# Patient Record
Sex: FEMALE | URBAN - METROPOLITAN AREA
[De-identification: names, ages, dates, MRNs, and addresses within clinical notes are randomized per-mention and may not be internally consistent; named-entity substitution may affect disease eponyms.]

---

## 2021-01-22 ENCOUNTER — IMMUNIZATION (OUTPATIENT)
Dept: LAB | Facility: HOSPITAL | Age: 41
End: 2021-01-22
Attending: EMERGENCY MEDICINE
Payer: MEDICAID

## 2021-01-22 DIAGNOSIS — Z23 NEED FOR VACCINATION: Primary | ICD-10-CM

## 2021-01-22 PROCEDURE — 0011A SARSCOV2 VAC 100MCG/0.5ML IM: CPT

## 2021-02-19 ENCOUNTER — IMMUNIZATION (OUTPATIENT)
Dept: LAB | Facility: HOSPITAL | Age: 41
End: 2021-02-19
Attending: EMERGENCY MEDICINE
Payer: MEDICAID

## 2021-02-19 DIAGNOSIS — Z23 NEED FOR VACCINATION: Primary | ICD-10-CM

## 2021-02-19 PROCEDURE — 0012A SARSCOV2 VAC 100MCG/0.5ML IM: CPT

## 2022-05-11 ENCOUNTER — EMPLOYEE HEALTH (OUTPATIENT)
Dept: OTHER | Facility: HOSPITAL | Age: 42
End: 2022-05-11
Attending: PREVENTIVE MEDICINE

## 2022-05-11 DIAGNOSIS — Z11.1 SCREENING-PULMONARY TB: ICD-10-CM

## 2022-05-11 DIAGNOSIS — Z01.84 IMMUNITY STATUS TESTING: Primary | ICD-10-CM

## 2022-05-11 LAB
MEV IGG SER-ACNC: >300 AU/ML (ref 16.5–?)
MUV IGG SER IA-ACNC: 88.9 AU/ML (ref 11–?)
RUBV IGG SER QL: POSITIVE
RUBV IGG SER-ACNC: 69.2 IU/ML (ref 10–?)
VZV IGG SER IA-ACNC: 536.5 (ref 165–?)

## 2022-05-11 PROCEDURE — 86787 VARICELLA-ZOSTER ANTIBODY: CPT

## 2022-05-11 PROCEDURE — 86735 MUMPS ANTIBODY: CPT

## 2022-05-11 PROCEDURE — 86765 RUBEOLA ANTIBODY: CPT

## 2022-05-11 PROCEDURE — 86480 TB TEST CELL IMMUN MEASURE: CPT

## 2022-05-11 PROCEDURE — 86762 RUBELLA ANTIBODY: CPT

## 2022-05-12 LAB
M TB IFN-G CD4+ T-CELLS BLD-ACNC: 0.09 IU/ML
M TB TUBERC IFN-G BLD QL: NEGATIVE
M TB TUBERC IGNF/MITOGEN IGNF CONTROL: >10 IU/ML
QFT TB1 AG MINUS NIL: 0 IU/ML
QFT TB2 AG MINUS NIL: -0.02 IU/ML

## 2022-10-12 ENCOUNTER — TELEPHONE (OUTPATIENT)
Dept: INTERNAL MEDICINE CLINIC | Facility: HOSPITAL | Age: 42
End: 2022-10-12

## 2022-10-12 ENCOUNTER — LAB ENCOUNTER (OUTPATIENT)
Dept: LAB | Age: 42
End: 2022-10-12
Attending: PREVENTIVE MEDICINE
Payer: COMMERCIAL

## 2022-10-12 DIAGNOSIS — Z20.822 SUSPECTED COVID-19 VIRUS INFECTION: ICD-10-CM

## 2022-10-12 DIAGNOSIS — Z20.822 SUSPECTED COVID-19 VIRUS INFECTION: Primary | ICD-10-CM

## 2022-10-12 LAB — SARS-COV-2 RNA RESP QL NAA+PROBE: NOT DETECTED

## 2022-11-07 ENCOUNTER — IMMUNIZATION (OUTPATIENT)
Dept: LAB | Age: 42
End: 2022-11-07
Attending: EMERGENCY MEDICINE
Payer: COMMERCIAL

## 2022-11-07 DIAGNOSIS — Z23 NEED FOR VACCINATION: Primary | ICD-10-CM

## 2022-11-07 PROCEDURE — 90686 IIV4 VACC NO PRSV 0.5 ML IM: CPT

## 2022-11-07 PROCEDURE — 90471 IMMUNIZATION ADMIN: CPT

## 2022-11-25 ENCOUNTER — OFFICE VISIT (OUTPATIENT)
Dept: FAMILY MEDICINE CLINIC | Facility: CLINIC | Age: 42
End: 2022-11-25
Payer: COMMERCIAL

## 2022-11-25 VITALS
BODY MASS INDEX: 32.29 KG/M2 | DIASTOLIC BLOOD PRESSURE: 72 MMHG | SYSTOLIC BLOOD PRESSURE: 117 MMHG | HEART RATE: 77 BPM | TEMPERATURE: 98 F | OXYGEN SATURATION: 98 % | HEIGHT: 59 IN | WEIGHT: 160.19 LBS

## 2022-11-25 DIAGNOSIS — Z00.00 ANNUAL PHYSICAL EXAM: Primary | ICD-10-CM

## 2022-11-25 PROCEDURE — 3008F BODY MASS INDEX DOCD: CPT | Performed by: FAMILY MEDICINE

## 2022-11-25 PROCEDURE — 3074F SYST BP LT 130 MM HG: CPT | Performed by: FAMILY MEDICINE

## 2022-11-25 PROCEDURE — 3078F DIAST BP <80 MM HG: CPT | Performed by: FAMILY MEDICINE

## 2022-11-25 PROCEDURE — 99386 PREV VISIT NEW AGE 40-64: CPT | Performed by: FAMILY MEDICINE

## 2022-11-25 RX ORDER — LEVOTHYROXINE SODIUM 88 UG/1
TABLET ORAL
COMMUNITY
Start: 2021-08-24 | End: 2022-11-28

## 2022-11-25 RX ORDER — ERGOCALCIFEROL 1.25 MG/1
50000 CAPSULE ORAL WEEKLY
COMMUNITY
Start: 2022-08-04

## 2022-11-25 RX ORDER — MONTELUKAST SODIUM 10 MG/1
10 TABLET ORAL DAILY
COMMUNITY
Start: 2022-06-06

## 2022-11-25 RX ORDER — CETIRIZINE HYDROCHLORIDE 10 MG/1
CAPSULE, LIQUID FILLED ORAL
COMMUNITY

## 2022-11-25 RX ORDER — LEVOTHYROXINE SODIUM 88 UG/1
TABLET ORAL
COMMUNITY
Start: 2022-11-07 | End: 2022-11-28

## 2022-11-25 RX ORDER — CETIRIZINE HYDROCHLORIDE 10 MG/1
10 TABLET ORAL DAILY
COMMUNITY
Start: 2022-06-06

## 2022-11-25 RX ORDER — PREDNISONE 20 MG/1
40 TABLET ORAL DAILY
COMMUNITY
Start: 2022-10-11

## 2022-11-25 RX ORDER — IBUPROFEN 800 MG/1
800 TABLET ORAL EVERY 8 HOURS PRN
COMMUNITY
Start: 2022-10-11

## 2022-11-27 ENCOUNTER — LAB ENCOUNTER (OUTPATIENT)
Dept: LAB | Age: 42
End: 2022-11-27
Attending: FAMILY MEDICINE
Payer: COMMERCIAL

## 2022-11-27 DIAGNOSIS — Z00.00 ANNUAL PHYSICAL EXAM: ICD-10-CM

## 2022-11-27 LAB
ALBUMIN SERPL-MCNC: 3.5 G/DL (ref 3.4–5)
ALBUMIN/GLOB SERPL: 0.9 {RATIO} (ref 1–2)
ALP LIVER SERPL-CCNC: 91 U/L
ALT SERPL-CCNC: 45 U/L
ANION GAP SERPL CALC-SCNC: 5 MMOL/L (ref 0–18)
AST SERPL-CCNC: 23 U/L (ref 15–37)
BASOPHILS # BLD AUTO: 0.04 X10(3) UL (ref 0–0.2)
BASOPHILS NFR BLD AUTO: 0.5 %
BILIRUB SERPL-MCNC: 0.3 MG/DL (ref 0.1–2)
BUN BLD-MCNC: 23 MG/DL (ref 7–18)
BUN/CREAT SERPL: 31.1 (ref 10–20)
CALCIUM BLD-MCNC: 8.6 MG/DL (ref 8.5–10.1)
CHLORIDE SERPL-SCNC: 109 MMOL/L (ref 98–112)
CHOLEST SERPL-MCNC: 171 MG/DL (ref ?–200)
CO2 SERPL-SCNC: 26 MMOL/L (ref 21–32)
CREAT BLD-MCNC: 0.74 MG/DL
DEPRECATED RDW RBC AUTO: 44 FL (ref 35.1–46.3)
EOSINOPHIL # BLD AUTO: 0.43 X10(3) UL (ref 0–0.7)
EOSINOPHIL NFR BLD AUTO: 5.3 %
ERYTHROCYTE [DISTWIDTH] IN BLOOD BY AUTOMATED COUNT: 12.8 % (ref 11–15)
EST. AVERAGE GLUCOSE BLD GHB EST-MCNC: 114 MG/DL (ref 68–126)
FASTING PATIENT LIPID ANSWER: YES
FASTING STATUS PATIENT QL REPORTED: YES
GFR SERPLBLD BASED ON 1.73 SQ M-ARVRAT: 102 ML/MIN/1.73M2 (ref 60–?)
GLOBULIN PLAS-MCNC: 3.9 G/DL (ref 2.8–4.4)
GLUCOSE BLD-MCNC: 95 MG/DL (ref 70–99)
HBA1C MFR BLD: 5.6 % (ref ?–5.7)
HCT VFR BLD AUTO: 41.9 %
HDLC SERPL-MCNC: 55 MG/DL (ref 40–59)
HGB BLD-MCNC: 13.5 G/DL
IMM GRANULOCYTES # BLD AUTO: 0.04 X10(3) UL (ref 0–1)
IMM GRANULOCYTES NFR BLD: 0.5 %
LDLC SERPL CALC-MCNC: 98 MG/DL (ref ?–100)
LYMPHOCYTES # BLD AUTO: 1.73 X10(3) UL (ref 1–4)
LYMPHOCYTES NFR BLD AUTO: 21.4 %
MCH RBC QN AUTO: 30.1 PG (ref 26–34)
MCHC RBC AUTO-ENTMCNC: 32.2 G/DL (ref 31–37)
MCV RBC AUTO: 93.3 FL
MONOCYTES # BLD AUTO: 0.58 X10(3) UL (ref 0.1–1)
MONOCYTES NFR BLD AUTO: 7.2 %
NEUTROPHILS # BLD AUTO: 5.25 X10 (3) UL (ref 1.5–7.7)
NEUTROPHILS # BLD AUTO: 5.25 X10(3) UL (ref 1.5–7.7)
NEUTROPHILS NFR BLD AUTO: 65.1 %
NONHDLC SERPL-MCNC: 116 MG/DL (ref ?–130)
OSMOLALITY SERPL CALC.SUM OF ELEC: 293 MOSM/KG (ref 275–295)
PLATELET # BLD AUTO: 235 10(3)UL (ref 150–450)
POTASSIUM SERPL-SCNC: 3.9 MMOL/L (ref 3.5–5.1)
PROT SERPL-MCNC: 7.4 G/DL (ref 6.4–8.2)
RBC # BLD AUTO: 4.49 X10(6)UL
SODIUM SERPL-SCNC: 140 MMOL/L (ref 136–145)
TRIGL SERPL-MCNC: 98 MG/DL (ref 30–149)
TSI SER-ACNC: 8.32 MIU/ML (ref 0.36–3.74)
VLDLC SERPL CALC-MCNC: 16 MG/DL (ref 0–30)
WBC # BLD AUTO: 8.1 X10(3) UL (ref 4–11)

## 2022-11-27 PROCEDURE — 80053 COMPREHEN METABOLIC PANEL: CPT

## 2022-11-27 PROCEDURE — 85025 COMPLETE CBC W/AUTO DIFF WBC: CPT

## 2022-11-27 PROCEDURE — 84443 ASSAY THYROID STIM HORMONE: CPT

## 2022-11-27 PROCEDURE — 80061 LIPID PANEL: CPT

## 2022-11-27 PROCEDURE — 83036 HEMOGLOBIN GLYCOSYLATED A1C: CPT

## 2022-11-28 ENCOUNTER — TELEPHONE (OUTPATIENT)
Dept: FAMILY MEDICINE CLINIC | Facility: CLINIC | Age: 42
End: 2022-11-28

## 2022-11-28 DIAGNOSIS — E03.9 HYPOTHYROIDISM, UNSPECIFIED TYPE: Primary | ICD-10-CM

## 2022-11-28 RX ORDER — LEVOTHYROXINE SODIUM 0.1 MG/1
100 TABLET ORAL DAILY
Qty: 90 TABLET | Refills: 3 | Status: SHIPPED | OUTPATIENT
Start: 2022-11-28 | End: 2023-11-23

## 2022-11-29 ENCOUNTER — HOSPITAL ENCOUNTER (OUTPATIENT)
Dept: MAMMOGRAPHY | Age: 42
Discharge: HOME | End: 2022-11-29
Attending: FAMILY MEDICINE
Payer: COMMERCIAL

## 2022-11-29 DIAGNOSIS — Z00.00 ANNUAL PHYSICAL EXAM: ICD-10-CM

## 2022-11-29 PROCEDURE — 77063 BREAST TOMOSYNTHESIS BI: CPT | Performed by: FAMILY MEDICINE

## 2022-11-29 PROCEDURE — 77067 SCR MAMMO BI INCL CAD: CPT | Performed by: FAMILY MEDICINE

## 2022-12-01 ENCOUNTER — MED REC SCAN ONLY (OUTPATIENT)
Dept: FAMILY MEDICINE CLINIC | Facility: CLINIC | Age: 42
End: 2022-12-01

## 2022-12-01 ENCOUNTER — TELEPHONE (OUTPATIENT)
Dept: FAMILY MEDICINE CLINIC | Facility: CLINIC | Age: 42
End: 2022-12-01

## 2023-01-24 ENCOUNTER — APPOINTMENT (OUTPATIENT)
Dept: URBAN - METROPOLITAN AREA CLINIC 244 | Age: 43
Setting detail: DERMATOLOGY
End: 2023-01-24

## 2023-01-24 DIAGNOSIS — L81.1 CHLOASMA: ICD-10-CM

## 2023-01-24 PROCEDURE — OTHER PRESCRIPTION: OTHER

## 2023-01-24 PROCEDURE — 99203 OFFICE O/P NEW LOW 30 MIN: CPT

## 2023-01-24 PROCEDURE — OTHER COUNSELING: OTHER

## 2023-01-24 RX ORDER — FLUOCINOLONE ACETONIDE, HYDROQUINONE, AND TRETINOIN .1; 40; .5 MG/G; MG/G; MG/G
CREAM TOPICAL QHS
Qty: 30 | Refills: 3 | Status: ACTIVE

## 2023-01-24 ASSESSMENT — LOCATION SIMPLE DESCRIPTION DERM
LOCATION SIMPLE: LEFT CHEEK
LOCATION SIMPLE: SUPERIOR FOREHEAD
LOCATION SIMPLE: RIGHT CHEEK

## 2023-01-24 ASSESSMENT — LOCATION DETAILED DESCRIPTION DERM
LOCATION DETAILED: SUPERIOR MID FOREHEAD
LOCATION DETAILED: LEFT INFERIOR CENTRAL MALAR CHEEK
LOCATION DETAILED: RIGHT INFERIOR CENTRAL MALAR CHEEK

## 2023-01-24 ASSESSMENT — LOCATION ZONE DERM: LOCATION ZONE: FACE

## 2023-01-24 NOTE — HPI: RASH
How Severe Is Your Rash?: mild
Is This A New Presentation, Or A Follow-Up?: Rash
Additional History: Used topical 3 years ago but rash recurred, got worse after pregnancy

## 2023-02-15 ENCOUNTER — TELEPHONE (OUTPATIENT)
Dept: INTERNAL MEDICINE CLINIC | Facility: HOSPITAL | Age: 43
End: 2023-02-15

## 2023-02-15 ENCOUNTER — LAB ENCOUNTER (OUTPATIENT)
Dept: LAB | Facility: HOSPITAL | Age: 43
End: 2023-02-15
Attending: PREVENTIVE MEDICINE

## 2023-02-15 DIAGNOSIS — Z20.822 SUSPECTED 2019 NOVEL CORONAVIRUS INFECTION: ICD-10-CM

## 2023-02-15 DIAGNOSIS — Z20.822 SUSPECTED 2019 NOVEL CORONAVIRUS INFECTION: Primary | ICD-10-CM

## 2023-02-15 LAB — SARS-COV-2 RNA RESP QL NAA+PROBE: NOT DETECTED

## 2023-02-16 NOTE — TELEPHONE ENCOUNTER
Results and RTW guidelines:    COVID RESULT:    [x] Viewed by employee in [address] RTW plan and instructions as indicated on triage call. Manager notified. Estimated RTW date: 02/16/2023  [] Discussed with employee   [x] Unable to reach by phone.   Sent via [employer] message      Test type:    [x] Rapid         [] Alinity         [] Outside test:       [x] NEGATIVE     Ordered Alinity retest?  [x]Yes   [] No (skip to RTW)   Ordered Rapid retest?   []Yes   [x] No (skip to RTW)           Dated to be taken:  02/17/2023    If Yes, PLACE ORDER NOW and instruct the following:  -Originally Symptomatic or Now Symptoms:   -RTW when sx improve- fever free for 24 hours w/o medications, Diarrhea/Vomiting for 24 hours w/o medications    -Originally  Asymptomatic  -Asymptomatic AND Vaccinated or Unvaccinated or Prior infection in past 90 days:   -May work and continue to monitor symptoms for the next 14 days.                                         -Rapid test day 2, rapid test day 5 (day 0 - exposure)

## 2023-03-27 ENCOUNTER — OFFICE VISIT (OUTPATIENT)
Dept: FAMILY MEDICINE CLINIC | Facility: CLINIC | Age: 43
End: 2023-03-27

## 2023-03-27 VITALS
DIASTOLIC BLOOD PRESSURE: 79 MMHG | BODY MASS INDEX: 34 KG/M2 | SYSTOLIC BLOOD PRESSURE: 115 MMHG | TEMPERATURE: 97 F | HEART RATE: 69 BPM | WEIGHT: 166 LBS

## 2023-03-27 DIAGNOSIS — Z91.018 FOOD ALLERGY: ICD-10-CM

## 2023-03-27 DIAGNOSIS — E04.1 THYROID NODULE: ICD-10-CM

## 2023-03-27 DIAGNOSIS — Z12.4 CERVICAL CANCER SCREENING: ICD-10-CM

## 2023-03-27 DIAGNOSIS — J30.2 SEASONAL ALLERGIES: ICD-10-CM

## 2023-03-27 DIAGNOSIS — E03.9 HYPOTHYROIDISM, UNSPECIFIED TYPE: Primary | ICD-10-CM

## 2023-03-27 PROCEDURE — 3074F SYST BP LT 130 MM HG: CPT | Performed by: FAMILY MEDICINE

## 2023-03-27 PROCEDURE — 99214 OFFICE O/P EST MOD 30 MIN: CPT | Performed by: FAMILY MEDICINE

## 2023-03-27 PROCEDURE — 3078F DIAST BP <80 MM HG: CPT | Performed by: FAMILY MEDICINE

## 2023-03-27 RX ORDER — EPINEPHRINE 0.3 MG/.3ML
INJECTION SUBCUTANEOUS
COMMUNITY
Start: 2023-03-06

## 2023-03-28 ENCOUNTER — LAB ENCOUNTER (OUTPATIENT)
Dept: LAB | Age: 43
End: 2023-03-28
Attending: FAMILY MEDICINE
Payer: COMMERCIAL

## 2023-03-28 DIAGNOSIS — E03.9 HYPOTHYROIDISM, UNSPECIFIED TYPE: ICD-10-CM

## 2023-03-28 LAB — TSI SER-ACNC: 2.04 MIU/ML (ref 0.36–3.74)

## 2023-03-28 PROCEDURE — 84443 ASSAY THYROID STIM HORMONE: CPT

## 2023-03-28 PROCEDURE — 36415 COLL VENOUS BLD VENIPUNCTURE: CPT

## 2023-03-30 ENCOUNTER — OFFICE VISIT (OUTPATIENT)
Dept: OBGYN CLINIC | Facility: CLINIC | Age: 43
End: 2023-03-30

## 2023-03-30 VITALS
WEIGHT: 164.81 LBS | HEIGHT: 59 IN | BODY MASS INDEX: 33.23 KG/M2 | SYSTOLIC BLOOD PRESSURE: 121 MMHG | DIASTOLIC BLOOD PRESSURE: 78 MMHG

## 2023-03-30 DIAGNOSIS — R53.83 OTHER FATIGUE: ICD-10-CM

## 2023-03-30 DIAGNOSIS — Z11.3 SCREENING EXAMINATION FOR SEXUALLY TRANSMITTED DISEASE: ICD-10-CM

## 2023-03-30 DIAGNOSIS — N89.8 VAGINAL ITCHING: ICD-10-CM

## 2023-03-30 DIAGNOSIS — R63.5 WEIGHT GAIN: ICD-10-CM

## 2023-03-30 DIAGNOSIS — N94.6 DYSMENORRHEA: ICD-10-CM

## 2023-03-30 DIAGNOSIS — G43.109 MIGRAINE WITH AURA AND WITHOUT STATUS MIGRAINOSUS, NOT INTRACTABLE: ICD-10-CM

## 2023-03-30 DIAGNOSIS — E04.9 ENLARGED THYROID: ICD-10-CM

## 2023-03-30 DIAGNOSIS — D21.9 FIBROIDS: ICD-10-CM

## 2023-03-30 DIAGNOSIS — Z63.79 OTHER STRESSFUL LIFE EVENTS AFFECTING FAMILY AND HOUSEHOLD: ICD-10-CM

## 2023-03-30 DIAGNOSIS — E03.9 HYPOTHYROIDISM, UNSPECIFIED TYPE: ICD-10-CM

## 2023-03-30 DIAGNOSIS — N39.3 STRESS INCONTINENCE IN FEMALE: ICD-10-CM

## 2023-03-30 DIAGNOSIS — Z12.4 SCREENING FOR MALIGNANT NEOPLASM OF CERVIX: ICD-10-CM

## 2023-03-30 DIAGNOSIS — Z01.419 WELL WOMAN EXAM: Primary | ICD-10-CM

## 2023-03-30 PROCEDURE — 99386 PREV VISIT NEW AGE 40-64: CPT | Performed by: NURSE PRACTITIONER

## 2023-03-30 PROCEDURE — 3078F DIAST BP <80 MM HG: CPT | Performed by: NURSE PRACTITIONER

## 2023-03-30 PROCEDURE — 3074F SYST BP LT 130 MM HG: CPT | Performed by: NURSE PRACTITIONER

## 2023-03-30 PROCEDURE — 3008F BODY MASS INDEX DOCD: CPT | Performed by: NURSE PRACTITIONER

## 2023-03-31 LAB
C TRACH DNA SPEC QL NAA+PROBE: NEGATIVE
N GONORRHOEA DNA SPEC QL NAA+PROBE: NEGATIVE

## 2023-04-01 LAB
GENITAL VAGINOSIS SCREEN: NEGATIVE
TRICHOMONAS SCREEN: NEGATIVE

## 2023-04-03 LAB — HPV I/H RISK 1 DNA SPEC QL NAA+PROBE: NEGATIVE

## 2023-05-16 ENCOUNTER — APPOINTMENT (OUTPATIENT)
Dept: URBAN - METROPOLITAN AREA CLINIC 244 | Age: 43
Setting detail: DERMATOLOGY
End: 2023-05-16

## 2023-05-16 DIAGNOSIS — L81.1 CHLOASMA: ICD-10-CM

## 2023-05-16 PROCEDURE — OTHER COUNSELING: OTHER

## 2023-05-16 PROCEDURE — OTHER PRESCRIPTION MEDICATION MANAGEMENT: OTHER

## 2023-05-16 ASSESSMENT — LOCATION SIMPLE DESCRIPTION DERM
LOCATION SIMPLE: RIGHT CHEEK
LOCATION SIMPLE: LEFT CHEEK
LOCATION SIMPLE: SUPERIOR FOREHEAD

## 2023-05-16 ASSESSMENT — LOCATION DETAILED DESCRIPTION DERM
LOCATION DETAILED: RIGHT INFERIOR CENTRAL MALAR CHEEK
LOCATION DETAILED: LEFT INFERIOR CENTRAL MALAR CHEEK
LOCATION DETAILED: SUPERIOR MID FOREHEAD

## 2023-05-16 ASSESSMENT — LOCATION ZONE DERM: LOCATION ZONE: FACE

## 2023-05-16 NOTE — PROCEDURE: PRESCRIPTION MEDICATION MANAGEMENT
Detail Level: Detailed
Continue Regimen: Tri-Kenisha 0.01 %-4 %-0.05 % topical cream QHS\\nQuantity: 30.0 g\\nSig: Apply at bedtime to discolored skin x 8 weeks. Avoid sunlight.
Render In Strict Bullet Format?: No

## 2023-06-03 ENCOUNTER — HOSPITAL ENCOUNTER (OUTPATIENT)
Dept: ULTRASOUND IMAGING | Facility: HOSPITAL | Age: 43
Discharge: HOME | End: 2023-06-03
Attending: FAMILY MEDICINE
Payer: COMMERCIAL

## 2023-06-03 DIAGNOSIS — E04.1 THYROID NODULE: ICD-10-CM

## 2023-06-03 DIAGNOSIS — E03.9 HYPOTHYROIDISM, UNSPECIFIED TYPE: ICD-10-CM

## 2023-06-03 PROCEDURE — 76536 US EXAM OF HEAD AND NECK: CPT | Performed by: FAMILY MEDICINE

## 2023-06-08 DIAGNOSIS — E06.9 THYROIDITIS: Primary | ICD-10-CM

## 2023-08-21 ENCOUNTER — LAB ENCOUNTER (OUTPATIENT)
Dept: LAB | Age: 43
End: 2023-08-21
Attending: NURSE PRACTITIONER
Payer: COMMERCIAL

## 2023-08-21 DIAGNOSIS — R53.83 OTHER FATIGUE: ICD-10-CM

## 2023-08-21 DIAGNOSIS — E03.9 HYPOTHYROIDISM, UNSPECIFIED TYPE: ICD-10-CM

## 2023-08-21 DIAGNOSIS — E06.9 THYROIDITIS: ICD-10-CM

## 2023-08-21 LAB
T3 SERPL-MCNC: 88 NG/DL (ref 60–181)
T3FREE SERPL-MCNC: 1.55 PG/ML (ref 2.4–4.2)
T4 FREE SERPL-MCNC: 0.5 NG/DL (ref 0.8–1.7)
T4 SERPL-MCNC: 5.1 UG/DL
THYROGLOB SERPL-MCNC: >500 U/ML (ref ?–60)
THYROPEROXIDASE AB SERPL-ACNC: 1575 U/ML (ref ?–60)
TSI SER-ACNC: 21.7 MIU/ML (ref 0.36–3.74)
VIT D+METAB SERPL-MCNC: 20.5 NG/ML (ref 30–100)

## 2023-08-21 PROCEDURE — 36415 COLL VENOUS BLD VENIPUNCTURE: CPT

## 2023-08-21 PROCEDURE — 84443 ASSAY THYROID STIM HORMONE: CPT

## 2023-08-21 PROCEDURE — 82306 VITAMIN D 25 HYDROXY: CPT

## 2023-08-21 PROCEDURE — 84481 FREE ASSAY (FT-3): CPT

## 2023-08-21 PROCEDURE — 86800 THYROGLOBULIN ANTIBODY: CPT

## 2023-08-21 PROCEDURE — 86376 MICROSOMAL ANTIBODY EACH: CPT

## 2023-08-21 PROCEDURE — 84439 ASSAY OF FREE THYROXINE: CPT

## 2023-08-22 DIAGNOSIS — E06.9 THYROIDITIS: Primary | ICD-10-CM

## 2023-08-22 RX ORDER — LEVOTHYROXINE SODIUM 112 UG/1
112 TABLET ORAL
Qty: 90 TABLET | Refills: 1 | Status: SHIPPED | OUTPATIENT
Start: 2023-08-22

## 2023-11-29 ENCOUNTER — OFFICE VISIT (OUTPATIENT)
Dept: FAMILY MEDICINE CLINIC | Facility: CLINIC | Age: 43
End: 2023-11-29
Payer: COMMERCIAL

## 2023-11-29 VITALS
BODY MASS INDEX: 35.68 KG/M2 | WEIGHT: 177 LBS | SYSTOLIC BLOOD PRESSURE: 127 MMHG | HEART RATE: 79 BPM | DIASTOLIC BLOOD PRESSURE: 84 MMHG | HEIGHT: 59 IN

## 2023-11-29 DIAGNOSIS — Z91.013 SHRIMP ALLERGY: ICD-10-CM

## 2023-11-29 DIAGNOSIS — D21.9 FIBROIDS: ICD-10-CM

## 2023-11-29 DIAGNOSIS — E06.3 HYPOTHYROIDISM DUE TO HASHIMOTO'S THYROIDITIS: ICD-10-CM

## 2023-11-29 DIAGNOSIS — R53.83 OTHER FATIGUE: ICD-10-CM

## 2023-11-29 DIAGNOSIS — Z12.31 SCREENING MAMMOGRAM FOR BREAST CANCER: ICD-10-CM

## 2023-11-29 DIAGNOSIS — Z00.00 WELL ADULT EXAM: Primary | ICD-10-CM

## 2023-11-29 DIAGNOSIS — E66.9 OBESITY (BMI 35.0-39.9 WITHOUT COMORBIDITY): ICD-10-CM

## 2023-11-29 DIAGNOSIS — J30.9 ALLERGIC RHINITIS, UNSPECIFIED SEASONALITY, UNSPECIFIED TRIGGER: ICD-10-CM

## 2023-11-29 DIAGNOSIS — N94.6 DYSMENORRHEA: ICD-10-CM

## 2023-11-29 DIAGNOSIS — E03.8 HYPOTHYROIDISM DUE TO HASHIMOTO'S THYROIDITIS: ICD-10-CM

## 2023-11-29 PROBLEM — E03.9 HYPOTHYROIDISM: Status: RESOLVED | Noted: 2023-03-27 | Resolved: 2023-11-29

## 2023-11-29 PROCEDURE — 99396 PREV VISIT EST AGE 40-64: CPT | Performed by: NURSE PRACTITIONER

## 2023-11-29 PROCEDURE — 99213 OFFICE O/P EST LOW 20 MIN: CPT | Performed by: NURSE PRACTITIONER

## 2023-11-29 PROCEDURE — 3074F SYST BP LT 130 MM HG: CPT | Performed by: NURSE PRACTITIONER

## 2023-11-29 PROCEDURE — 3008F BODY MASS INDEX DOCD: CPT | Performed by: NURSE PRACTITIONER

## 2023-11-29 PROCEDURE — 3079F DIAST BP 80-89 MM HG: CPT | Performed by: NURSE PRACTITIONER

## 2023-11-29 RX ORDER — EPINEPHRINE 0.3 MG/.3ML
INJECTION SUBCUTANEOUS
Qty: 2 EACH | Refills: 5 | Status: SHIPPED | OUTPATIENT
Start: 2023-11-29

## 2023-11-29 NOTE — ASSESSMENT & PLAN NOTE
Screening labs  Mammogram ordered  Please aim to eat a diet high in fresh fruits and vegetables, lean protein sources, complex carbohydrates and limited processed and fast foods. Try to get at least 150 minutes of exercise per week-a combination of weight resistance and cardio is preferred.     Refer dietician   Had flu shot  Up to date pap

## 2023-11-29 NOTE — ASSESSMENT & PLAN NOTE
Recheck tsh levels  Continue levothyroxine 112 mcg daily  Please aim to eat a diet high in fresh fruits and vegetables, lean protein sources, complex carbohydrates and limited processed and fast foods. Try to get at least 150 minutes of exercise per week-a combination of weight resistance and cardio is preferred.

## 2023-11-29 NOTE — ASSESSMENT & PLAN NOTE
It is essential that you decrease the amount of carbohydrates that you ingest (bread products, rice, pasta, potatoes, starchy vegetables and sugary beverages). Also try to increase the amount of vegetables and protein that you eat daily. Decrease the amount of processed and fast foods. Try to exercise at least 30 minutes per day, 4-5 times per week, combination of cardio and weight training.    Refer dietician

## 2023-11-29 NOTE — ASSESSMENT & PLAN NOTE
Likely related to fibroids and han-menopause  Discussed use of nsaids for pain  Follow up ob gyne if symptoms not improving.

## 2023-11-30 ENCOUNTER — LAB ENCOUNTER (OUTPATIENT)
Dept: LAB | Facility: HOSPITAL | Age: 43
End: 2023-11-30
Attending: NURSE PRACTITIONER
Payer: COMMERCIAL

## 2023-11-30 DIAGNOSIS — E03.8 HYPOTHYROIDISM DUE TO HASHIMOTO'S THYROIDITIS: ICD-10-CM

## 2023-11-30 DIAGNOSIS — E06.3 HYPOTHYROIDISM DUE TO HASHIMOTO'S THYROIDITIS: ICD-10-CM

## 2023-11-30 DIAGNOSIS — Z00.00 WELL ADULT EXAM: ICD-10-CM

## 2023-11-30 DIAGNOSIS — E06.3 HYPOTHYROIDISM DUE TO HASHIMOTO'S THYROIDITIS: Primary | ICD-10-CM

## 2023-11-30 DIAGNOSIS — E03.8 HYPOTHYROIDISM DUE TO HASHIMOTO'S THYROIDITIS: Primary | ICD-10-CM

## 2023-11-30 LAB
CHOLEST SERPL-MCNC: 129 MG/DL (ref ?–200)
FASTING PATIENT GLUCOSE ANSWER: YES
FASTING PATIENT LIPID ANSWER: YES
GLUCOSE BLD-MCNC: 103 MG/DL (ref 70–99)
HDLC SERPL-MCNC: 49 MG/DL (ref 40–59)
LDLC SERPL CALC-MCNC: 57 MG/DL (ref ?–100)
NONHDLC SERPL-MCNC: 80 MG/DL (ref ?–130)
T4 FREE SERPL-MCNC: 0.9 NG/DL (ref 0.8–1.7)
TRIGL SERPL-MCNC: 133 MG/DL (ref 30–149)
TSI SER-ACNC: 6.22 MIU/ML (ref 0.55–4.78)
VLDLC SERPL CALC-MCNC: 19 MG/DL (ref 0–30)

## 2023-11-30 PROCEDURE — 82947 ASSAY GLUCOSE BLOOD QUANT: CPT

## 2023-11-30 PROCEDURE — 80061 LIPID PANEL: CPT

## 2023-11-30 PROCEDURE — 36415 COLL VENOUS BLD VENIPUNCTURE: CPT

## 2023-11-30 PROCEDURE — 84439 ASSAY OF FREE THYROXINE: CPT

## 2023-11-30 PROCEDURE — 84443 ASSAY THYROID STIM HORMONE: CPT

## 2023-11-30 RX ORDER — LEVOTHYROXINE SODIUM 0.12 MG/1
125 TABLET ORAL
Qty: 90 TABLET | Refills: 1 | Status: SHIPPED | OUTPATIENT
Start: 2023-11-30

## 2023-12-06 ENCOUNTER — TELEPHONE (OUTPATIENT)
Dept: OBGYN CLINIC | Facility: CLINIC | Age: 43
End: 2023-12-06

## 2023-12-06 RX ORDER — CETIRIZINE HYDROCHLORIDE 10 MG/1
10 TABLET ORAL DAILY
Qty: 90 TABLET | Refills: 0 | Status: SHIPPED | OUTPATIENT
Start: 2023-12-06 | End: 2024-03-05

## 2023-12-06 RX ORDER — CETIRIZINE HYDROCHLORIDE 10 MG/1
10 TABLET ORAL DAILY
Qty: 90 TABLET | Refills: 0 | Status: SHIPPED | OUTPATIENT
Start: 2023-12-06 | End: 2023-12-06

## 2023-12-27 ENCOUNTER — TELEPHONE (OUTPATIENT)
Dept: INTERNAL MEDICINE CLINIC | Facility: HOSPITAL | Age: 43
End: 2023-12-27

## 2023-12-27 ENCOUNTER — LAB ENCOUNTER (OUTPATIENT)
Dept: LAB | Age: 43
End: 2023-12-27
Attending: PREVENTIVE MEDICINE
Payer: COMMERCIAL

## 2023-12-27 DIAGNOSIS — Z20.822 SUSPECTED COVID-19 VIRUS INFECTION: Primary | ICD-10-CM

## 2023-12-27 DIAGNOSIS — Z20.822 SUSPECTED COVID-19 VIRUS INFECTION: ICD-10-CM

## 2023-12-27 LAB — SARS-COV-2 RNA RESP QL NAA+PROBE: NOT DETECTED

## 2023-12-27 NOTE — TELEPHONE ENCOUNTER
[] [address] [facility]  []JOSE D   [x] [address] [facility]  Manager : Dollycaitlyn Valente    HAVE YOU RECEIVED THE COVID-19 Vaccine? Yes [x]    No []          If yes, date(s) received:    01/22/2021; 02/19/2021; 03/24/2022        Which vaccine:  Pfizer []     Halie Hernandez [x]    J&J []      SYMPTOMS (reported via dashboard):  [] asymptomatic  [x] symptomatic  [] GI symptoms only    Symptom onset date: 12/23/2023  Fever   > 100F             Yes []      Cough                          Yes [x]      Shortness of breath  Yes []      Congestion                 Yes [x]      Runny nose                Yes [x]        Loss of Smell              Yes []        Loss of Taste             Yes []       Sore throat                 Yes [x]       Fatigue                        Yes [x]       Body Aches                Yes [x]        Chills                           Yes [x]        Headache                   Yes [x]             GI symptoms             Yes []     No [x]                     Nausea   []          Vomiting            []                                    Diarrhea  []          Upset stomach []      Employee has positive COVID Exposure? Yes []     No [x]    Date of exposure:   []  Coworker                       [] patient                        [] Family/friend    Employee has a history of Covid?   Yes []     No [x]   If Yes, when:    When was the last shift you worked?: 12/22/2023      PLAN:     [id number] testing ordered: [x] Rapid    [] Alinity              Date test is to be taken:   12/27/2023    []  Outside testing                           Notes:    INSTRUCTIONS PROVIDED:    [x]  Employee was instructed to call Central scheduling at [phone] or use [employer] to make an appointment for their testing   [x]  May return to work if employee views negative result in [address] and remains fever, vomiting, and diarrhea free  []  May continue to work if remains asymptomatic and views negative result in [address]  []  Follow up for condition update when resulting  []  If symptoms develop, stay home and call hotline for rapid test order  []  If COVID positive results, off work minimum of 5 days from positive test or onset of symptoms (day 0)     [x]  Plan noted above  [x]  Length of time to obtain results  []  Quarantine instructions  [x]  S/S of worsening infection/condition and importance of prompt medical re-evaluation including when to seek emergency care.    [x] The employee voiced understanding

## 2023-12-28 NOTE — TELEPHONE ENCOUNTER
Results and RTW guidelines:    COVID RESULT:    [x] Viewed by employee in [facility]. RTW plan and instructions as indicated on triage call. Manager notified. Estimated RTW date:   [] Discussed with employee   [x] Unable to reach by phone. Sent via [employer] message      Test type:    [x] Rapid         [] Alinity         [] Outside test:       [x] NEGATIVE     Ordered Alinity retest?  []Yes   [x] No (skip to RTW)   Ordered Rapid retest?   []Yes   [x] No (skip to RTW)           Dated to be taken:      If Yes, PLACE ORDER NOW and instruct the following:  -Originally Symptomatic or Now Symptoms:   -RTW when sx improve- fever free for 24 hours w/o medications, Diarrhea/Vomiting for 24 hours w/o medications    -Originally  Asymptomatic  -Asymptomatic AND Vaccinated or Unvaccinated or Prior infection in past 90 days:   -May work and continue to monitor symptoms for the next 10 days.                                         -Alinity test day 2, Alinity test day 5 (day 0 - exposure)      Notes:     RTW PLAN:    []  If COVID positive results, off work minimum of 5 days from positive test or onset of symptoms (day 0)        On day 5, if asymptomatic or mildly symptomatic (with improving symptoms) may return to work day 6          On day 5, if symptomatic, call Employee Health for RTW screening        []  COVID positive result - call Employee Health on day 5 after symptom onset. The employee needs to be cleared by Employee Health to RTW. [x] RTW immediately, continue to monitor for sx  [] RTW when sx improve; must be fever free for 24 hours w/o medications, Diarrhea/Vomiting free for 24 hours w/o medications  [] Alinity ordered; continue to monitor sx and call for new/worsening sx.   Discuss RTW guidelines with manager  [] May continue to work  [] Follow up with PCP  [] Home until further instruction from hotline with Alinity results  INSTRUCTIONS PROVIDED:  [x]  Plan as noted above  []  Length of time to obtain results   [] Quarantine instructions  []  Masking protocol   []  S/S of worsening infection/condition and importance of prompt medical re-evaluation including when to seek emergency care  [] If symptoms develop, stay home and call hotline for rapid test order    Estimated RTW date:      [] The employee voiced understanding of above plan/instructions  [x] Manager Notified

## 2024-03-28 ENCOUNTER — TELEPHONE (OUTPATIENT)
Dept: FAMILY MEDICINE CLINIC | Facility: CLINIC | Age: 44
End: 2024-03-28

## 2024-04-03 ENCOUNTER — TELEPHONE (OUTPATIENT)
Dept: FAMILY MEDICINE CLINIC | Facility: CLINIC | Age: 44
End: 2024-04-03

## 2024-04-03 ENCOUNTER — OFFICE VISIT (OUTPATIENT)
Dept: FAMILY MEDICINE CLINIC | Facility: CLINIC | Age: 44
End: 2024-04-03
Payer: COMMERCIAL

## 2024-04-03 VITALS
SYSTOLIC BLOOD PRESSURE: 137 MMHG | HEIGHT: 59 IN | BODY MASS INDEX: 33.5 KG/M2 | HEART RATE: 66 BPM | DIASTOLIC BLOOD PRESSURE: 81 MMHG | WEIGHT: 166.19 LBS

## 2024-04-03 DIAGNOSIS — E03.9 HYPOTHYROIDISM, UNSPECIFIED TYPE: ICD-10-CM

## 2024-04-03 DIAGNOSIS — Z12.11 SCREEN FOR COLON CANCER: ICD-10-CM

## 2024-04-03 DIAGNOSIS — E66.9 OBESITY (BMI 30.0-34.9): Primary | ICD-10-CM

## 2024-04-03 PROCEDURE — 99214 OFFICE O/P EST MOD 30 MIN: CPT | Performed by: FAMILY MEDICINE

## 2024-04-03 RX ORDER — CETIRIZINE HYDROCHLORIDE 10 MG/1
10 TABLET ORAL DAILY
Qty: 90 TABLET | Refills: 4 | Status: SHIPPED | OUTPATIENT
Start: 2024-04-03

## 2024-04-03 RX ORDER — TIRZEPATIDE 2.5 MG/.5ML
2.5 INJECTION, SOLUTION SUBCUTANEOUS WEEKLY
Qty: 2 ML | Refills: 0 | Status: SHIPPED | OUTPATIENT
Start: 2024-04-03

## 2024-04-03 NOTE — TELEPHONE ENCOUNTER
Approved    Prior authorization approved Case ID: [id number]      Payer: [employer]    [phone]    [phone]   CaseId:[id number];Status:Approved;Review Type:Prior Auth;Coverage Start Date:03/04/2024;Coverage End Date:11/29/2024;   Approval Details    Authorized from March 4, 2024 to November 29, 2024      Electronic appeal: Not supported   View History

## 2024-04-03 NOTE — PROGRESS NOTES
Libby Fonseca is a 44 year old female.   Chief Complaint   Patient presents with    Weight Loss     HPI:   New patient to me.   Newly diagnosed with levothyroxine in past few years. Now on 125 mcg and still feels very tired.   Wants to try weight loss medications. Took phentermine but caused palpitations.   Reports active at work and walking on treadmill at home. Has more energy after exercising.    Has loss 11 lbs since November - walking and changing her diet.   Current Outpatient Medications on File Prior to Visit   Medication Sig Dispense Refill    levothyroxine 125 MCG Oral Tab Take 1 tablet (125 mcg total) by mouth before breakfast. 90 tablet 1    EPINEPHrine 0.3 MG/0.3ML Injection Solution Auto-injector INJECT CONTENT OF 1 PEN INTO THE MUSCLE SIDE OF THE THIGH FOR ALLERGIC REACTION 2 each 5    montelukast 10 MG Oral Tab Take 1 tablet (10 mg total) by mouth daily.       No current facility-administered medications on file prior to visit.      Past Medical History:   Diagnosis Date    Allergic rhinitis like 8 years ago    Hyperfunctioning follicular adenoma of thyroid gland     Obesity       Social History:  Social History     Socioeconomic History    Marital status:    Tobacco Use    Smoking status: Never    Smokeless tobacco: Never   Vaping Use    Vaping Use: Never used   Substance and Sexual Activity    Alcohol use: Yes     Comment: social    Drug use: Never        REVIEW OF SYSTEMS:   GENERAL HEALTH: feels well otherwise  SKIN: denies any unusual skin lesions or rashes  HEENT: denies eye complaints,denies sore throat, denies ear pain  RESPIRATORY: denies shortness of breath, denies cough  CARDIOVASCULAR: denies chest pain  GI: denies abdominal pain and denies heartburn  NEURO: denies headaches      EXAM:   /81   Pulse 66   Ht 4' 11\" (1.499 m)   Wt 166 lb 3.2 oz (75.4 kg)   LMP 11/20/2023 (Within Days)   BMI 33.57 kg/m²   GENERAL: well developed, well nourished,in no apparent  distress  LUNGS: clear to auscultation  CARDIO: RRR without murmur  GI: good BS's,no masses, HSM or tenderness  EXTREMITIES: no cyanosis, clubbing or edema      ASSESSMENT AND PLAN:   1. Obesity (BMI 30.0-34.9)  Discussed potential side effects of zepbound and importance of drinking plenty of water, eating high fiber diet.  - Tirzepatide-Weight Management (ZEPBOUND) 2.5 MG/0.5ML Subcutaneous Solution Auto-injector; Inject 2.5 mg into the skin once a week.  Dispense: 2 mL; Refill: 0    2. Hypothyroidism, unspecified type  Repeat labs today.   - Assay, Thyroid Stim Hormone; Future  - Free T4, (Free Thyroxine); Future  - Thyroid Antithyroglobulin AB; Future    3. Screen for colon cancer  Declines GI referral - will do cologuard for now.   - COLOGUARD COLON CANCER SCREENING (EXTERNAL)      The patient indicates understanding of these issues and agrees to the plan.      Manisha Pastor MD  4/3/2024  11:26 AM

## 2024-04-03 NOTE — TELEPHONE ENCOUNTER
Zepbound prior auth needed     Please answer question below:  Has the patient engaged in a trial of behavioral modification and dietary restriction for at least 3 months?

## 2024-04-03 NOTE — TELEPHONE ENCOUNTER
Sent signed cologuard requisition order form to [employer] fax# [phone]. Confirmation rec'd

## 2024-04-23 ENCOUNTER — LAB ENCOUNTER (OUTPATIENT)
Dept: LAB | Facility: HOSPITAL | Age: 44
End: 2024-04-23
Attending: FAMILY MEDICINE
Payer: COMMERCIAL

## 2024-04-23 DIAGNOSIS — E03.9 HYPOTHYROIDISM, UNSPECIFIED TYPE: ICD-10-CM

## 2024-04-23 LAB
T4 FREE SERPL-MCNC: 1.3 NG/DL (ref 0.8–1.7)
THYROGLOB SERPL-MCNC: >500 U/ML (ref ?–60)
TSI SER-ACNC: 0.45 MIU/ML (ref 0.55–4.78)

## 2024-04-23 PROCEDURE — 36415 COLL VENOUS BLD VENIPUNCTURE: CPT

## 2024-04-23 PROCEDURE — 84439 ASSAY OF FREE THYROXINE: CPT

## 2024-04-23 PROCEDURE — 84443 ASSAY THYROID STIM HORMONE: CPT

## 2024-04-23 PROCEDURE — 86800 THYROGLOBULIN ANTIBODY: CPT

## 2024-04-25 DIAGNOSIS — E03.8 HYPOTHYROIDISM DUE TO HASHIMOTO'S THYROIDITIS: Primary | ICD-10-CM

## 2024-04-25 DIAGNOSIS — E06.3 HYPOTHYROIDISM DUE TO HASHIMOTO'S THYROIDITIS: Primary | ICD-10-CM

## 2024-06-04 DIAGNOSIS — E06.3 HYPOTHYROIDISM DUE TO HASHIMOTO'S THYROIDITIS: ICD-10-CM

## 2024-06-04 DIAGNOSIS — E03.8 HYPOTHYROIDISM DUE TO HASHIMOTO'S THYROIDITIS: ICD-10-CM

## 2024-06-04 NOTE — TELEPHONE ENCOUNTER
Pharmacy requesting refill     levothyroxine 125 MCG Oral Tab Take 1 tablet (125 mcg total) by mouth before breakfast. 90 tablet 1

## 2024-06-09 NOTE — TELEPHONE ENCOUNTER
Please review. Protocol Failed or has No Protocol.    Requested Prescriptions   Pending Prescriptions Disp Refills    levothyroxine 125 MCG Oral Tab 90 tablet 3     Sig: Take 1 tablet (125 mcg total) by mouth before breakfast.       Thyroid Medication Protocol Failed - 6/4/2024  5:14 PM        Failed - Last TSH value is normal     Lab Results   Component Value Date    TSH 0.452 (L) 04/23/2024                 Passed - TSH in past 12 months        Passed - In person appointment or virtual visit in the past 12 mos or appointment in next 3 mos     Recent Outpatient Visits              2 months ago Obesity (BMI 30.0-34.9)    [facility], Manisha Blevins MD    Office Visit    6 months ago Well adult exam    [facility], Sheri Quan APRN    Office Visit    1 year ago Well woman exam    [facility] - OB/GYN Jane Cunningham APRN    Office Visit    1 year ago Hypothyroidism, unspecified type    [facility], Love Vo MD    Office Visit    1 year ago Annual physical exam    [facility] [place] Tosha Weiss MD    Office Visit          Future Appointments         Provider Department Appt Notes    In 2 days Manisha Pastor MD [facility]                          Future Appointments         Provider Department Appt Notes    In 2 days Manisha Pastor MD [facility]             Recent Outpatient Visits              2 months ago Obesity (BMI 30.0-34.9)    [facility]    [facility]    6 months [facility]    Office Visit    1 year ago Well woman exam    [place]  [facility] [place] - OB/GYN Jane Cunningham APRN    Office Visit    1 year ago Hypothyroidism, unspecified type    [facility], [facility], Love Vo MD    Office Visit    1 year ago Annual physical exam    [facility], [facility], Tosha Hameed MD    Office Visit

## 2024-06-10 RX ORDER — LEVOTHYROXINE SODIUM 0.12 MG/1
125 TABLET ORAL
Qty: 90 TABLET | Refills: 3 | Status: SHIPPED | OUTPATIENT
Start: 2024-06-10

## 2024-06-11 ENCOUNTER — OFFICE VISIT (OUTPATIENT)
Dept: FAMILY MEDICINE CLINIC | Facility: CLINIC | Age: 44
End: 2024-06-11
Payer: COMMERCIAL

## 2024-06-11 VITALS
HEART RATE: 73 BPM | WEIGHT: 159.63 LBS | SYSTOLIC BLOOD PRESSURE: 119 MMHG | HEIGHT: 59 IN | DIASTOLIC BLOOD PRESSURE: 84 MMHG | BODY MASS INDEX: 32.18 KG/M2

## 2024-06-11 DIAGNOSIS — E66.9 OBESITY (BMI 30.0-34.9): ICD-10-CM

## 2024-06-11 PROCEDURE — 99213 OFFICE O/P EST LOW 20 MIN: CPT | Performed by: FAMILY MEDICINE

## 2024-06-11 RX ORDER — TIRZEPATIDE 5 MG/.5ML
5 INJECTION, SOLUTION SUBCUTANEOUS WEEKLY
Qty: 2 ML | Refills: 1 | Status: SHIPPED | OUTPATIENT
Start: 2024-06-11 | End: 2024-07-03

## 2024-06-11 NOTE — PROGRESS NOTES
Libby Fonseca is a 44 year old female.   Chief Complaint   Patient presents with    Weight Loss     HPI:   Having mild diarrhea next day after the injection but no nausea. Is losing weight now - did not with the 2.5 mg. Cut out sugary foods and walking more. Reports decreased appetite overall. No desire to eat hamburger or pizza.    Current Outpatient Medications on File Prior to Visit   Medication Sig Dispense Refill    levothyroxine 125 MCG Oral Tab Take 1 tablet (125 mcg total) by mouth before breakfast. 90 tablet 3    Tirzepatide-Weight Management (ZEPBOUND) 2.5 MG/0.5ML Subcutaneous Solution Auto-injector Inject 2.5 mg into the skin once a week. 2 mL 0    cetirizine 10 MG Oral Tab Take 1 tablet (10 mg total) by mouth daily. 90 tablet 4    EPINEPHrine 0.3 MG/0.3ML Injection Solution Auto-injector INJECT CONTENT OF 1 PEN INTO THE MUSCLE SIDE OF THE THIGH FOR ALLERGIC REACTION 2 each 5    montelukast 10 MG Oral Tab Take 1 tablet (10 mg total) by mouth daily. (Patient not taking: Reported on 6/11/2024)       No current facility-administered medications on file prior to visit.      Past Medical History:    Allergic rhinitis    Hyperfunctioning follicular adenoma of thyroid gland    Obesity      Social History:  Social History     Socioeconomic History    Marital status:    Tobacco Use    Smoking status: Never    Smokeless tobacco: Never   Vaping Use    Vaping status: Never Used   Substance and Sexual Activity    Alcohol use: Yes     Comment: social    Drug use: Never        REVIEW OF SYSTEMS:   Review of Systems   See HPI     EXAM:   /84   Pulse 73   Ht 4' 11\" (1.499 m)   Wt 159 lb 9.6 oz (72.4 kg)   LMP 11/20/2023 (Within Days)   BMI 32.24 kg/m²   GENERAL: well developed, well nourished,in no apparent distress  LUNGS: clear to auscultation  CARDIO: RRR without murmur      ASSESSMENT AND PLAN:   1. Obesity (BMI 30.0-34.9)  Doing very well with zepbound. Would like to continue it. Renewed the 5 mg.  Does not want to go up for now. Follow up in 2 months.         The patient indicates understanding of these issues and agrees to the plan.      Manisha Pastor MD  6/11/2024  8:48 AM

## 2024-07-17 ENCOUNTER — TELEPHONE (OUTPATIENT)
Dept: FAMILY MEDICINE CLINIC | Facility: CLINIC | Age: 44
End: 2024-07-17

## 2024-07-17 DIAGNOSIS — E66.9 OBESITY (BMI 35.0-39.9 WITHOUT COMORBIDITY): Primary | ICD-10-CM

## 2024-07-17 RX ORDER — TIRZEPATIDE 7.5 MG/.5ML
7.5 INJECTION, SOLUTION SUBCUTANEOUS WEEKLY
Qty: 2 ML | Refills: 0 | Status: SHIPPED | OUTPATIENT
Start: 2024-07-17 | End: 2024-08-08

## 2024-07-17 NOTE — TELEPHONE ENCOUNTER
Patient is requesting zepbound refills. Patient is currently on 5mg and is agreeable to increasing to 7.5 mg as discussed in LOV. Please advise.

## 2024-09-18 DIAGNOSIS — E06.3 HYPOTHYROIDISM DUE TO HASHIMOTO'S THYROIDITIS: ICD-10-CM

## 2024-09-24 RX ORDER — LEVOTHYROXINE SODIUM 125 UG/1
125 TABLET ORAL
Qty: 90 TABLET | Refills: 3 | Status: SHIPPED | OUTPATIENT
Start: 2024-09-24

## 2024-09-24 NOTE — TELEPHONE ENCOUNTER
Please Review. Protocol Failed; No Protocol   Thyroid Medication Protocol Failed      Last TSH value is normal          Lab Results   Component Value Date     TSH 0.452 (L) 04/23/2024      Requested Prescriptions   Pending Prescriptions Disp Refills    levothyroxine 125 MCG Oral Tab 90 tablet 3     Sig: Take 1 tablet (125 mcg total) by mouth before breakfast.       Thyroid Medication Protocol Failed - 9/18/2024  5:40 PM        Failed - Last TSH value is normal     Lab Results   Component Value Date    TSH 0.452 (L) 04/23/2024                 Passed - TSH in past 12 months        Passed - In person appointment or virtual visit in the past 12 mos or appointment in next 3 mos     Recent Outpatient Visits              3 months ago [facility] (BMI 30.0-34.9)    [facility]    Office Visit    5 months ago Obesity (BMI 30.0-34.9)    [facility]    Office Visit    10 months ago Well adult exam    [facility], Sheri Quan APRN    Office Visit    1 year ago Well woman exam    [facility] Jane Cunningham, [facility]    Office Visit    1 year ago Hypothyroidism, unspecified type    [facility], Love Vo MD    Office Visit                               Recent Outpatient Visits              3 months ago Obesity (BMI 30.0-34.9)    [facility]    Office Visit    5 months ago Obesity (BMI 30.0-34.9)    [facility]    Office Visit    10 months ago Well adult exam    [facility], Sheri Quan APRN    Office Visit    1 year ago Well woman exam    [facility]  Jane Cunningham APRN    Office Visit    1 year ago Hypothyroidism, unspecified type    [facility], Love Vo MD    Office Visit

## 2024-11-12 DIAGNOSIS — E66.811 OBESITY (BMI 30.0-34.9): ICD-10-CM

## 2024-11-12 DIAGNOSIS — E66.9 OBESITY (BMI 35.0-39.9 WITHOUT COMORBIDITY): ICD-10-CM

## 2024-11-12 RX ORDER — TIRZEPATIDE 2.5 MG/.5ML
2.5 INJECTION, SOLUTION SUBCUTANEOUS WEEKLY
Qty: 2 ML | Refills: 0 | Status: CANCELLED | OUTPATIENT
Start: 2024-11-12

## 2024-11-16 RX ORDER — TIRZEPATIDE 7.5 MG/.5ML
7.5 INJECTION, SOLUTION SUBCUTANEOUS WEEKLY
Qty: 2 ML | Refills: 0 | Status: CANCELLED | OUTPATIENT
Start: 2024-11-16 | End: 2024-12-08

## 2024-11-16 NOTE — TELEPHONE ENCOUNTER
Please review. Protocol Failed; No Protocol    Requested Prescriptions   Pending Prescriptions Disp Refills    Tirzepatide-Weight Management (ZEPBOUND) 7.5 MG/0.5ML Subcutaneous Solution Auto-injector 2 mL 0     Sig: Inject 7.5 mg into the skin once a week for 4 doses.       There is no refill protocol information for this order            Future Appointments         Provider Department Appt Notes    [facility] 2 months [facility] last px 11/29/2023          Recent Outpatient Visits              5 months ago Obesity (BMI 30.0-34.9)    [facility]    Office Visit    7 months ago Obesity (BMI 30.0-34.9)    [facility]    Office Visit    11 months ago Well adult exam    [facility], Sheri Quan APRN    Office Visit    1 year ago Well woman exam    [facility] - OB/GYN Jane Cunningham APRN    Office Visit    1 year ago Hypothyroidism, unspecified type    [facility], Love Vo MD    Office Visit

## 2024-11-18 RX ORDER — TIRZEPATIDE 10 MG/.5ML
10 INJECTION, SOLUTION SUBCUTANEOUS WEEKLY
Qty: 2 ML | Refills: 0 | Status: SHIPPED | OUTPATIENT
Start: 2024-11-18 | End: 2024-12-10

## 2024-11-18 NOTE — TELEPHONE ENCOUNTER
Due for an appointment before next refill. At least every 3 months to document weight loss for insurance to continue covering it. Can use res 24 in next 3 weeks.

## 2024-11-20 NOTE — TELEPHONE ENCOUNTER
2nd attempt - left message to call back; see below note from pcp for patient to schedule an appointment for medication refills

## 2024-12-04 DIAGNOSIS — E66.811 OBESITY (BMI 30.0-34.9): ICD-10-CM

## 2024-12-09 RX ORDER — TIRZEPATIDE 10 MG/.5ML
10 INJECTION, SOLUTION SUBCUTANEOUS WEEKLY
Qty: 2 ML | Refills: 0 | OUTPATIENT
Start: 2024-12-09 | End: 2024-12-31

## 2024-12-09 NOTE — TELEPHONE ENCOUNTER
I left detailed message on verbal release verified # [phone] and made aware of Dr. Pastor's interpretation and recommendations; also making aware of [employer] message being sent [1st attempt]    RN Triage - please check if patient read [employer] message, if not please make 2nd attempt to call

## 2024-12-09 NOTE — TELEPHONE ENCOUNTER
Please review. Protocol Failed; No Protocol      Patient never picked up refill sent on 11/18/2024.     Please sign and send to Triage support so they can start a Prior Authorization. Thank you      Requested Prescriptions   Pending Prescriptions Disp Refills    Tirzepatide-Weight Management (ZEPBOUND) 10 MG/0.5ML Subcutaneous Solution Auto-injector 2 mL 0     Sig: Inject 10 mg into the skin once a week for 4 doses.       There is no refill protocol information for this order            Future Appointments         Provider Department Appt Notes    In 1 week Brooke Nelson MD [facility] - OB/GYN NP, Annual Exam    In 2 months Manisha Pastor MD [facility] thyroid and weigh - last px 11/29/2023          Recent Outpatient Visits              6 months ago Obesity (BMI 30.0-34.9)    [facility], Manisha Blevins MD    Office Visit    8 months ago Obesity (BMI 30.0-34.9)    [facility], Manisha Blevins MD    Office Visit    1 year ago Well adult exam    [facility], Sheri Quan APRN    Office Visit    1 year ago Well woman exam    [facility] - OB/GYN Jane Cunningham APRN    Office Visit    1 year ago Hypothyroidism, unspecified type    [facility], Love Vo MD    Office Visit

## 2024-12-09 NOTE — TELEPHONE ENCOUNTER
It cannot be renewed. Insurance does not cover it to continue unless show weight loss. I asked pt to follow up 2 months after so back in August and if I place order, it will be denied without documentation.

## 2024-12-10 NOTE — TELEPHONE ENCOUNTER
Advised patient of Dr. Pastor's note. Patient verbalized understanding.  Appointment made for 12/12/2024 at 8:30am with Dr Pastor in [place].

## 2024-12-12 ENCOUNTER — LAB ENCOUNTER (OUTPATIENT)
Dept: LAB | Age: 44
End: 2024-12-12
Attending: FAMILY MEDICINE
Payer: COMMERCIAL

## 2024-12-12 ENCOUNTER — OFFICE VISIT (OUTPATIENT)
Dept: FAMILY MEDICINE CLINIC | Facility: CLINIC | Age: 44
End: 2024-12-12
Payer: COMMERCIAL

## 2024-12-12 ENCOUNTER — TELEPHONE (OUTPATIENT)
Dept: FAMILY MEDICINE CLINIC | Facility: CLINIC | Age: 44
End: 2024-12-12

## 2024-12-12 VITALS
WEIGHT: 153 LBS | DIASTOLIC BLOOD PRESSURE: 84 MMHG | BODY MASS INDEX: 30.84 KG/M2 | SYSTOLIC BLOOD PRESSURE: 131 MMHG | HEIGHT: 59 IN | HEART RATE: 60 BPM

## 2024-12-12 DIAGNOSIS — E55.9 VITAMIN D DEFICIENCY: ICD-10-CM

## 2024-12-12 DIAGNOSIS — Z00.00 ROUTINE MEDICAL EXAM: ICD-10-CM

## 2024-12-12 DIAGNOSIS — E66.811 OBESITY (BMI 30.0-34.9): ICD-10-CM

## 2024-12-12 DIAGNOSIS — Z12.31 SCREENING MAMMOGRAM FOR BREAST CANCER: Primary | ICD-10-CM

## 2024-12-12 LAB
ALBUMIN SERPL-MCNC: 4.2 G/DL (ref 3.2–4.8)
ALBUMIN/GLOB SERPL: 1.5 {RATIO} (ref 1–2)
ALP LIVER SERPL-CCNC: 87 U/L
ALT SERPL-CCNC: 32 U/L
ANION GAP SERPL CALC-SCNC: 6 MMOL/L (ref 0–18)
AST SERPL-CCNC: 24 U/L (ref ?–34)
BASOPHILS # BLD AUTO: 0.02 X10(3) UL (ref 0–0.2)
BASOPHILS NFR BLD AUTO: 0.3 %
BILIRUB SERPL-MCNC: 0.7 MG/DL (ref 0.3–1.2)
BUN BLD-MCNC: 16 MG/DL (ref 9–23)
BUN/CREAT SERPL: 21.9 (ref 10–20)
CALCIUM BLD-MCNC: 9.6 MG/DL (ref 8.7–10.4)
CHLORIDE SERPL-SCNC: 109 MMOL/L (ref 98–112)
CHOLEST SERPL-MCNC: 144 MG/DL (ref ?–200)
CO2 SERPL-SCNC: 27 MMOL/L (ref 21–32)
CREAT BLD-MCNC: 0.73 MG/DL
DEPRECATED RDW RBC AUTO: 41.1 FL (ref 35.1–46.3)
EGFRCR SERPLBLD CKD-EPI 2021: 103 ML/MIN/1.73M2 (ref 60–?)
EOSINOPHIL # BLD AUTO: 0.33 X10(3) UL (ref 0–0.7)
EOSINOPHIL NFR BLD AUTO: 4.5 %
ERYTHROCYTE [DISTWIDTH] IN BLOOD BY AUTOMATED COUNT: 12.6 % (ref 11–15)
FASTING PATIENT LIPID ANSWER: YES
FASTING STATUS PATIENT QL REPORTED: YES
GLOBULIN PLAS-MCNC: 2.8 G/DL (ref 2–3.5)
GLUCOSE BLD-MCNC: 96 MG/DL (ref 70–99)
HCT VFR BLD AUTO: 41.9 %
HDLC SERPL-MCNC: 48 MG/DL (ref 40–59)
HGB BLD-MCNC: 13.9 G/DL
IMM GRANULOCYTES # BLD AUTO: 0.02 X10(3) UL (ref 0–1)
IMM GRANULOCYTES NFR BLD: 0.3 %
LDLC SERPL CALC-MCNC: 70 MG/DL (ref ?–100)
LYMPHOCYTES # BLD AUTO: 1.91 X10(3) UL (ref 1–4)
LYMPHOCYTES NFR BLD AUTO: 26.1 %
MCH RBC QN AUTO: 29.6 PG (ref 26–34)
MCHC RBC AUTO-ENTMCNC: 33.2 G/DL (ref 31–37)
MCV RBC AUTO: 89.1 FL
MONOCYTES # BLD AUTO: 0.55 X10(3) UL (ref 0.1–1)
MONOCYTES NFR BLD AUTO: 7.5 %
NEUTROPHILS # BLD AUTO: 4.5 X10 (3) UL (ref 1.5–7.7)
NEUTROPHILS # BLD AUTO: 4.5 X10(3) UL (ref 1.5–7.7)
NEUTROPHILS NFR BLD AUTO: 61.3 %
NONHDLC SERPL-MCNC: 96 MG/DL (ref ?–130)
OSMOLALITY SERPL CALC.SUM OF ELEC: 295 MOSM/KG (ref 275–295)
PLATELET # BLD AUTO: 216 10(3)UL (ref 150–450)
POTASSIUM SERPL-SCNC: 4.1 MMOL/L (ref 3.5–5.1)
PROT SERPL-MCNC: 7 G/DL (ref 5.7–8.2)
RBC # BLD AUTO: 4.7 X10(6)UL
SODIUM SERPL-SCNC: 142 MMOL/L (ref 136–145)
TRIGL SERPL-MCNC: 149 MG/DL (ref 30–149)
TSI SER-ACNC: 1.06 UIU/ML (ref 0.55–4.78)
VIT B12 SERPL-MCNC: 1247 PG/ML (ref 211–911)
VIT D+METAB SERPL-MCNC: 18.4 NG/ML (ref 30–100)
VLDLC SERPL CALC-MCNC: 23 MG/DL (ref 0–30)
WBC # BLD AUTO: 7.3 X10(3) UL (ref 4–11)

## 2024-12-12 PROCEDURE — 80053 COMPREHEN METABOLIC PANEL: CPT

## 2024-12-12 PROCEDURE — 85025 COMPLETE CBC W/AUTO DIFF WBC: CPT

## 2024-12-12 PROCEDURE — 80061 LIPID PANEL: CPT

## 2024-12-12 PROCEDURE — 82306 VITAMIN D 25 HYDROXY: CPT

## 2024-12-12 PROCEDURE — 82607 VITAMIN B-12: CPT

## 2024-12-12 PROCEDURE — 84443 ASSAY THYROID STIM HORMONE: CPT

## 2024-12-12 PROCEDURE — 99396 PREV VISIT EST AGE 40-64: CPT | Performed by: FAMILY MEDICINE

## 2024-12-12 PROCEDURE — 36415 COLL VENOUS BLD VENIPUNCTURE: CPT

## 2024-12-12 RX ORDER — TIRZEPATIDE 2.5 MG/.5ML
2.5 INJECTION, SOLUTION SUBCUTANEOUS WEEKLY
Qty: 6 ML | Refills: 1 | Status: SHIPPED | OUTPATIENT
Start: 2024-12-12 | End: 2024-12-12

## 2024-12-12 RX ORDER — TIRZEPATIDE 5 MG/.5ML
5 INJECTION, SOLUTION SUBCUTANEOUS WEEKLY
Qty: 6 ML | Refills: 0 | Status: SHIPPED | OUTPATIENT
Start: 2024-12-12 | End: 2025-01-03

## 2024-12-12 NOTE — PROGRESS NOTES
HPI:   Libby Fonseca is a 44 year old female who presents for a complete physical exam.    Had first dose of zepbound in may. Has loss 13 lbs - just below 10% BW loss. Did not use the zepbound in the month of November. Plans to do the cologuard.   Reports following healthy diet but not exercising as much - plans to restart.   Would like to go back on zepbound  - discussed going back to 7.5 mg would be too much and cause GI upset. Can do 5 mg   Wt Readings from Last 3 Encounters:   24 153 lb (69.4 kg)   24 159 lb 9.6 oz (72.4 kg)   24 166 lb 3.2 oz (75.4 kg)     Body mass index is 30.9 kg/m².       Current Outpatient Medications   Medication Sig Dispense Refill    Tirzepatide-Weight Management (ZEPBOUND) 2.5 MG/0.5ML Subcutaneous Solution Auto-injector Inject 2.5 mg into the skin once a week. 6 mL 1    levothyroxine 125 MCG Oral Tab Take 1 tablet (125 mcg total) by mouth before breakfast. 90 tablet 3    cetirizine 10 MG Oral Tab Take 1 tablet (10 mg total) by mouth daily. 90 tablet 4    EPINEPHrine 0.3 MG/0.3ML Injection Solution Auto-injector INJECT CONTENT OF 1 PEN INTO THE MUSCLE SIDE OF THE THIGH FOR ALLERGIC REACTION 2 each 5    montelukast 10 MG Oral Tab Take 1 tablet (10 mg total) by mouth daily. (Patient not taking: Reported on 2024)        Past Medical History:    Allergic rhinitis    Hyperfunctioning follicular adenoma of thyroid gland    Obesity      Past Surgical History:   Procedure Laterality Date      , and 2016      Family History   Problem Relation Age of Onset    Breast Cancer Mother     Asthma Sister     Diabetes Other     Asthma Sister       Social History:   Social History     Socioeconomic History    Marital status:    Tobacco Use    Smoking status: Never    Smokeless tobacco: Never   Vaping Use    Vaping status: Never Used   Substance and Sexual Activity    Alcohol use: Yes     Comment: social    Drug use: Never          REVIEW OF SYSTEMS:   GENERAL:  feels well otherwise  Review of Systems   See HPI   EXAM:   /84   Pulse 60   Ht 4' 11\" (1.499 m)   Wt 153 lb (69.4 kg)   LMP 11/20/2024   BMI 30.90 kg/m²     GENERAL: well developed, well nourished,in no apparent distress  SKIN: no rashes,no suspicious lesions  HEENT: atraumatic, normocephalic,ears and throat are clear  EYES:PERRLA, EOMI, conjunctiva are clear  LUNGS: clear to auscultation  CARDIO: RRR without murmur  GI: good BS's,no masses, HSM or tenderness  EXTREMITIES: no cyanosis, clubbing or edema  NEURO: Oriented times three,cranial nerves are intact,motor and sensory are grossly intact    ASSESSMENT AND PLAN:   Libby Fonseca is a 44 year old female who presents for a complete physical exam.    1. Obesity (BMI 30.0-34.9)  Continue healthy diet and exercise.   - Tirzepatide-Weight Management (ZEPBOUND) 5 MG/0.5ML Subcutaneous Solution Auto-injector; Inject 5 mg into the skin once a week for 4 doses.  Dispense: 6 mL; Refill: 0    2. Screening mammogram for breast cancer    - [facility] NATALIA 2D+3D SCREENING BILAT (CPT=77067/15759); Future    3. Vitamin D deficiency    - Vitamin D; Future    4. Routine medical exam    - CBC With Differential With Platelet; Future  - Comp Metabolic Panel (14); Future  - Lipid Panel; Future  - [facility] NATALIA 2D+3D SCREENING BILAT (CPT=77067/01015); Future  - TSH W Reflex To Free T4; Future  - Vitamin B12; Future  - Vitamin D; Future  - Gastro GI Telephone Colon Screen; Future       Manisha Pastor MD  12/12/2024  8:25 AM

## 2024-12-16 NOTE — TELEPHONE ENCOUNTER
Zepbound Approval Details - sent [employer] message to inform    Authorized from November 12, 2024 to August 12, 2025   No

## 2025-01-03 ENCOUNTER — TELEPHONE (OUTPATIENT)
Dept: OBGYN CLINIC | Facility: CLINIC | Age: 45
End: 2025-01-03

## 2025-01-03 RX ORDER — NITROFURANTOIN 25; 75 MG/1; MG/1
100 CAPSULE ORAL 2 TIMES DAILY
Qty: 10 CAPSULE | Refills: 0 | Status: SHIPPED | OUTPATIENT
Start: 2025-01-03 | End: 2025-01-08

## 2025-01-06 DIAGNOSIS — E06.3 HYPOTHYROIDISM DUE TO HASHIMOTO'S THYROIDITIS: ICD-10-CM

## 2025-01-09 RX ORDER — LEVOTHYROXINE SODIUM 125 UG/1
125 TABLET ORAL
Qty: 90 TABLET | Refills: 3 | OUTPATIENT
Start: 2025-01-09

## 2025-03-18 DIAGNOSIS — E66.811 OBESITY (BMI 30.0-34.9): ICD-10-CM

## 2025-03-18 NOTE — TELEPHONE ENCOUNTER
Left message to call back, if she has been off for 3 months, she may need to start lower dose if MD agreeable to filling,   Patient may need to fill at Hospital pharmacies for if covered? Has she explored if covered by plan? I see PA done last year so may be, why did she stop med?     Last visit 12/2024 for annual

## 2025-03-18 NOTE — TELEPHONE ENCOUNTER
Patient would like a refill on Rx Zepbound 5MG. Patient said she has not taken medication since January 2025. I do not see medication on current list. Please advise      Verified pharmacy [facility] outpatient

## 2025-03-19 RX ORDER — TIRZEPATIDE 5 MG/.5ML
5 INJECTION, SOLUTION SUBCUTANEOUS WEEKLY
Qty: 6 ML | Refills: 0 | Status: CANCELLED | OUTPATIENT
Start: 2025-03-19 | End: 2025-04-10

## 2025-03-19 NOTE — TELEPHONE ENCOUNTER
Needs to start from beginning. Hospital insurance has not been covering it. Option is [facility] and $400 a month.

## 2025-03-19 NOTE — TELEPHONE ENCOUNTER
Dr Pastor, please advise    Last 2024    What dose should the patient take?    Patient (name and  verified) states she used the Zepbound in December to January and states that after that she could not get the medication due to a prior auth so she stopped the medication. She has not taken the medication since her last dose in mid January.     [facility] used [facility]

## 2025-03-21 NOTE — TELEPHONE ENCOUNTER
No response letter generated.  Triage support, please print letter under communications tab and mail via [employer]. Thank you.

## 2025-03-24 ENCOUNTER — HOSPITAL ENCOUNTER (OUTPATIENT)
Dept: MAMMOGRAPHY | Age: 45
Discharge: HOME | End: 2025-03-24
Attending: FAMILY MEDICINE
Payer: COMMERCIAL

## 2025-03-24 DIAGNOSIS — Z12.31 SCREENING MAMMOGRAM FOR BREAST CANCER: ICD-10-CM

## 2025-03-24 DIAGNOSIS — Z00.00 ROUTINE MEDICAL EXAM: ICD-10-CM

## 2025-03-24 PROCEDURE — 77063 BREAST TOMOSYNTHESIS BI: CPT | Performed by: FAMILY MEDICINE

## 2025-03-24 PROCEDURE — 77067 SCR MAMMO BI INCL CAD: CPT | Performed by: FAMILY MEDICINE

## 2025-03-24 RX ORDER — TIRZEPATIDE 2.5 MG/.5ML
2.5 INJECTION, SOLUTION SUBCUTANEOUS WEEKLY
Qty: 2 ML | Refills: 0 | OUTPATIENT
Start: 2025-03-24

## 2025-03-24 NOTE — TELEPHONE ENCOUNTER
Routed to Dr Pastor for advise, thanks.  Rx pended.     Requested Prescriptions     Pending Prescriptions Disp Refills    Tirzepatide-Weight Management (ZEPBOUND) 2.5 MG/0.5ML Subcutaneous Solution Auto-injector 2 mL 0     Sig: Inject 2.5 mg into the skin once a week.       Last Office Visit with PCP: 12/12/2024

## 2025-04-02 ENCOUNTER — OFFICE VISIT (OUTPATIENT)
Dept: OBGYN CLINIC | Facility: CLINIC | Age: 45
End: 2025-04-02
Payer: COMMERCIAL

## 2025-04-02 VITALS
BODY MASS INDEX: 31.65 KG/M2 | DIASTOLIC BLOOD PRESSURE: 80 MMHG | HEIGHT: 59 IN | SYSTOLIC BLOOD PRESSURE: 130 MMHG | WEIGHT: 157 LBS | HEART RATE: 62 BPM

## 2025-04-02 DIAGNOSIS — Z91.013 SHRIMP ALLERGY: ICD-10-CM

## 2025-04-02 DIAGNOSIS — Z01.419 ENCOUNTER FOR WELL WOMAN EXAM WITH ROUTINE GYNECOLOGICAL EXAM: Primary | ICD-10-CM

## 2025-04-02 DIAGNOSIS — N39.3 SUI (STRESS URINARY INCONTINENCE, FEMALE): ICD-10-CM

## 2025-04-02 DIAGNOSIS — N76.0 BV (BACTERIAL VAGINOSIS): ICD-10-CM

## 2025-04-02 DIAGNOSIS — B96.89 BV (BACTERIAL VAGINOSIS): ICD-10-CM

## 2025-04-02 DIAGNOSIS — N93.9 ABNORMAL UTERINE BLEEDING (AUB): ICD-10-CM

## 2025-04-02 PROCEDURE — 99203 OFFICE O/P NEW LOW 30 MIN: CPT | Performed by: STUDENT IN AN ORGANIZED HEALTH CARE EDUCATION/TRAINING PROGRAM

## 2025-04-02 PROCEDURE — 99386 PREV VISIT NEW AGE 40-64: CPT | Performed by: STUDENT IN AN ORGANIZED HEALTH CARE EDUCATION/TRAINING PROGRAM

## 2025-04-02 RX ORDER — EPINEPHRINE 0.3 MG/.3ML
INJECTION SUBCUTANEOUS
Qty: 2 EACH | Refills: 5 | Status: SHIPPED | OUTPATIENT
Start: 2025-04-02

## 2025-04-02 RX ORDER — METRONIDAZOLE 500 MG/1
500 TABLET ORAL 2 TIMES DAILY
Qty: 14 TABLET | Refills: 0 | Status: SHIPPED | OUTPATIENT
Start: 2025-04-02 | End: 2025-04-09

## 2025-04-02 NOTE — PROGRESS NOTES
Libby Fonseca is a 45 year old female  Patient's last menstrual period was 03/15/2025.   Chief Complaint   Patient presents with    Annual     NP, annual exam   Reviewed Preventative/Wellness form with patient.  Discuss hormone levels, heavy cycles      Hx of know uterine fibroids  Still having heavy periods  +dysmenorrhea  Takes motrin and midol with minimal relief  Used toradol in the past with better relief    Regular cycles, q28-30d; last about 5-7d    Denies vasomotor symptoms    Had IUD in the past but had irregular cycles    Lps 3/30/23 wnl; reports remote hx of abnl pap  Last mammo 3/24/25 wnl   Last colonoscopy-has cologuard kit at home    C/o stress urinary incontinece      OBSTETRICS HISTORY:     OB History    Para Term  AB Living   5 3     2 3   SAB IAB Ectopic Multiple Live Births   2       3      # Outcome Date GA Lbr Adalid/2nd Weight Sex Type Anes PTL Lv   5 Para 09/15/16    F NORMAL SPONT None  GONZALES   4 Para 00    M NORMAL SPONT EPI, None  GONZALES   3 Para 01/10/99    M NORMAL SPONT EPI  GONZALES   2 SAB  12w0d             Birth Comments: D&C   1 SAB                GYNE HISTORY:     Hx Prior Abnormal Pap: No  Pap Date: 23  Pap Result Notes: negative   Menarche: 9 (2025  1:19 PM)  Period Cycle (Days): 28 (2025  1:19 PM)  Period Duration (Days): 5 (2025  1:19 PM)  Period Flow: heavy with clots (2025  1:19 PM)  Use of Birth Control (if yes, specify type): None (2025  1:19 PM)  Hx Prior Abnormal Pap: No (2025  1:19 PM)  Pap Date: 23 (2025  1:19 PM)  Pap Result Notes: negative (2025  1:19 PM)        Latest Ref Rng & Units 3/30/2023     3:01 PM   RECENT PAP RESULTS   INTERPRETATION/RESULT: Negative for intraepithelial lesion or malignancy Negative for intraepithelial lesion or malignancy    HPV Negative Negative          MEDICAL HISTORY:     Past Medical History:    Allergic rhinitis    Hyperfunctioning follicular adenoma of thyroid gland     Obesity       SURGICAL HISTORY:     Past Surgical History:   Procedure Laterality Date      , and 2016       SOCIAL HISTORY:     Social History     Socioeconomic History    Marital status:    Tobacco Use    Smoking status: Never    Smokeless tobacco: Never   Vaping Use    Vaping status: Never Used   Substance and Sexual Activity    Alcohol use: Yes     Comment: social    Drug use: Never    Sexual activity: Yes     Partners: Male     Social Drivers of Health     Food Insecurity: No Food Insecurity (2025)    NCSS - Food Insecurity     Worried About Running Out of Food in the Last Year: No     Ran Out of Food in the Last Year: No   Transportation Needs: No Transportation Needs (2025)    NCSS - Transportation     Lack of Transportation: No   Housing Stability: Not At Risk (2025)    NCSS - Housing/Utilities     Has Housing: Yes     Worried About Losing Housing: No     Unable to Get Utilities: No        FAMILY HISTORY:     Family History   Problem Relation Age of Onset    Breast Cancer Mother     Asthma Sister     Diabetes Other     Asthma Sister        MEDICATIONS:       Current Outpatient Medications:     EPINEPHrine 0.3 MG/0.3ML Injection Solution Auto-injector, INJECT CONTENT OF 1 PEN INTO THE MUSCLE SIDE OF THE THIGH FOR ALLERGIC REACTION, Disp: 2 each, Rfl: 5    metroNIDAZOLE (FLAGYL) 500 MG Oral Tab, Take 1 tablet (500 mg total) by mouth 2 (two) times daily for 7 days., Disp: 14 tablet, Rfl: 0    levothyroxine 125 MCG Oral Tab, Take 1 tablet (125 mcg total) by mouth before breakfast., Disp: 90 tablet, Rfl: 3    cetirizine 10 MG Oral Tab, Take 1 tablet (10 mg total) by mouth daily., Disp: 90 tablet, Rfl: 4    montelukast 10 MG Oral Tab, Take 1 tablet (10 mg total) by mouth daily., Disp: , Rfl:     Tirzepatide-Weight Management (ZEPBOUND) 2.5 MG/0.5ML Subcutaneous Solution Auto-injector, Inject 2.5 mg into the skin once a week. (Patient not taking: Reported on 2025), Disp: 2 mL,  Rfl: 0    ALLERGIES:     Allergies[1]      REVIEW OF SYSTEMS:     Constitutional:    denies fever / chills  Eyes:     denies blurred or double vision  Cardiovascular:  denies chest pain or palpitations  Respiratory:    denies shortness of breath  Gastrointestinal:  denies severe abdominal pain, frequent diarrhea or constipation, nausea / vomiting  Genitourinary:    denies dysuria, bothersome incontinence  Skin/Breast:   denies any breast pain, lumps, or discharge  Neurological:    denies frequent severe headaches  Psychiatric:   denies depression or anxiety, thoughts of harming self or others  Heme/Lymph:    denies easy bruising or bleeding      PHYSICAL EXAM:   Blood pressure 130/80, pulse 62, height 4' 11\" (1.499 m), weight 157 lb (71.2 kg), last menstrual period 03/15/2025.  Constitutional:  well developed, well nourished  Head/Face:  normocephalic  Neck/Thyroid: thyroid symmetric, no thyromegaly, no nodules, no adenopathy  Lymphatic: no abnormal supraclavicular or axillary adenopathy is noted  Respiratory:      chest wall symmetric and nontender on palpation, clear to asculation bilateral, no wheezing, rales, ronchi, and resonance normal upon percussion  Cardiovascular: chest normal in appearance, regular rate and rhythm, no murmurs, PMI palpated midclavicular line  Breast:   normal without palpable masses, tenderness, asymmetry, nipple discharge, nipple retraction or skin changes  Abdomen:   soft, nontender, nondistended, no masses  Skin/Hair:  no unusual rashes or bruises  Extremities:  no edema, no cyanosis, non tender bilaterally  Psychiatric:   oriented to time, place, person and situation. Appropriate mood and affect    Pelvic Exam:  GYNE/: External Genitalia: Normal appearing, no lesions. Urethral meatus appear wnl, no abnormal discharge or lesions noted.          Bladder: well supported, urethra wnl, no lesions or fissures                     Vagina: normal pink mucosa, no lesions, normal clear  discharge.                      Uterus: anteverted, mobile, non tender, normal size                     Cervix: Normal,                      Adnexa: non tender, no masses, normal size  Anus: no hemorroids         ASSESSMENT & PLAN:     Libby was seen today for annual.    Diagnoses and all orders for this visit:    Encounter for well woman exam with routine gynecological exam    Shrimp allergy  -     EPINEPHrine 0.3 MG/0.3ML Injection Solution Auto-injector; INJECT CONTENT OF 1 PEN INTO THE MUSCLE SIDE OF THE THIGH FOR ALLERGIC REACTION  -     metroNIDAZOLE (FLAGYL) 500 MG Oral Tab; Take 1 tablet (500 mg total) by mouth 2 (two) times daily for 7 days.    Abnormal uterine bleeding (AUB)  -     US PELVIS (TRANSABDOMINAL AND TRANSVAGINAL) (CPT=76856/12500); Future  -     EPINEPHrine 0.3 MG/0.3ML Injection Solution Auto-injector; INJECT CONTENT OF 1 PEN INTO THE MUSCLE SIDE OF THE THIGH FOR ALLERGIC REACTION  -     CBC W Differential W Platelet; Future  -     Ferritin; Future  -     metroNIDAZOLE (FLAGYL) 500 MG Oral Tab; Take 1 tablet (500 mg total) by mouth 2 (two) times daily for 7 days.    CANDIDO (stress urinary incontinence, female)  -     Pelvic Floor Therapy - [facility]  -     metroNIDAZOLE (FLAGYL) 500 MG Oral Tab; Take 1 tablet (500 mg total) by mouth 2 (two) times daily for 7 days.    BV (bacterial vaginosis)  -     metroNIDAZOLE (FLAGYL) 500 MG Oral Tab; Take 1 tablet (500 mg total) by mouth 2 (two) times daily for 7 days.      Normal exam.  Pap smear UTD.   Recommend repeat pap smear with co-testing every 3 years for normal/ -HPV.  Recommend annual screening mammograms.   Recommend screening colonoscopy starting at 45  Recommend DEXA scan for osteoporosis as indicated.  Discussed importance of incorporating frequent weight bearing exercises and supplemental Vitamin D  Maintain healthy lifestyle with well-balance diet and daily exercise.  Return to clinic in one year or as needed.    Pt counseled on  possible etiologies of heavy periods and/or irregular bleeding including uterine fibroids, DUB/anovulatory bleeding and other benign and less common malignant etiologies.  Discussed possible work-up options including exam, pelvic US and endometrial biopsy.  Discussed treatment options including medical and surgical.  Medical options include OCPs, Nuvaring, patch for cycle control along with depo provera for menstrual suppression.  Discussed Mirena IUD and menstrual benefits.  Discussed surgical options that include endometrial ablation and hysterectomy.  Pt counseled on risks/benefits of each of the appropriate options.  Follow up after pelvic US    FOLLOW-UP     No follow-ups on file.      Brooke Nelson MD  4/2/2025         [1]   Allergies  Allergen Reactions    Shrimp (Diagnostic) ANAPHYLAXIS, PALPITATIONS and SHORTNESS OF BREATH    Peanuts Coughing and RASH    Pollen Extract RASH

## 2025-04-02 NOTE — PATIENT INSTRUCTIONS
[facility]-Menopause/MENOPAUSE TIPS    THE MENOPAUSE MANIFESTO by: Dr. Vielka Plummer    THE NEW MENOPAUSE by: Dr. Elif Sears    NEXT LEVEL: Your Guide to kicking Ass, Feeling Great, and Crushing Goals Through Menopause and Beyond by: Yuliana Caro PhD    Dietary changes, exercise and weight loss are cornerstones of management of Menopause     Check EPA list of 'Clean 15' and 'dirty dozen' while deciding to buy organic  Organic foods have less pesticides and chemical contaminants     Diet changes :  Best evidence is for whole food plant based/Mediterranean diet for health.  https://www.heart.org/en/healthy-living/healthy-eating/eat-smart/nutrition-basics/mediterranean-diet    Small portion sized meals.  Metabolism changes in menopause require 200 Kcal/day less to maintain weight    High Protein at least 100g per day  Low carb - 30-40 gm with each meal.  High Fiber 25-30 gm each day (not included in supplements)    NO Sugar, Soda, Juices and Sweets.  --- Fill half of your plate with low carb veggies and greens as discussed.  -- eat low sugar fruits apple, pears and berries: blueberries, strawberries, raspberries etc.    -- avoid tropical fruits like pineapple, mil, grapes, watermelon, papaya  -- about 25% fruits and 75% veggies  to reduce sugar intake    More freshly prepared meals than frozen or take-out  More plant based foods than processed meats or foods  (try to keep ingredients <5)  Incorporate good fats - Eg. :Extra virgin olive oil, Nuts, Avocado.    Sleep:  https://www.karl.nih.gov/health/menopause/sleep-problems-and-menopause-what-can-i-do  Sleep in room with temperature <67 degrees  Cotton/natural fiber sheets and blankets  Fan  Meditation (try balance analilia) start with 1 min/daily      Exercise:  7 minute workout: https://www.[employer].com/wellness/interactive/2025/7-minute-workout-science-original/  Brisk walk 30-45 min every day or a total of 150 min per week - Goal 12,000 steps/daily  Lift weights 3-4  times a week - Heavy - 3 Sets of 7 Heavy reps  - whole body dumbbell or machines  Core exercises (bird dog, plank with mountain climbers, dead bug) 3 sets of 10 (three - four times a week )  Balance - yoga or balance exercises  10 minutes of jumping per week    Environment/Stress:  Avoid alcohol as can trigger hot flashes and be a source of empty calories    Consider starting the following supplements:  Vit B12  (chewable or drops version as it requires salivia for activation)    Vitamin D with K2 supplementation  Magnesium L threonate or glycinate  DHA & EPA  Probiotics    If None of the above changes are working to address your symptoms - follow up with us in the office.    Vulvar Care     Cleaning:  -use plain warm (not hot) water (no soap) to wash if needed or can take Sitz bath (see below)  -use water, fingers, & only very gentle, fragrance-free, moisturizing cleanser      (e.g. Cetaphil or CeraVe hydrating or gentle cleansers meant for eczema/psoriasis & faces)  -only pat dry gently (no rubbing)     Sitz baths:  -soothing and cleansing  -Get a Sitz Bath from [employer] or [employer]--fits over toilet, you can fill with water to soak vulva without having to get in bathtub  -Use 1-3 times per day for ten minutes at a time  -Pat dry, apply thin layer of vaseline to protect the skin     Protection/Prevention:  -goal is to maintain an intact, moist (non-dehydrated) skin barrier  -need to prevent irritation & over-drying of skin that can lead to micro-tears, cracking, and itching, pain, & bleeding.  -do not scratch or wipe hard (mechanical injury)  -avoid strong chemicals/fragrances (fragrance free soap & detergents, avoid fabric softeners)  -avoid other irritants (e.g. sweat, leaked urine, leaked stool)  -avoid excessive friction (no thongs, always use lubricant for intercourse, daily barrier cream or ointment)  -breathable underwear, change underwear if sweaty/wet, no underwear while sleeping if possible.  -DO USE a  barrier product for protection       (e.g. Aquaphor, vaseline, A&D ointment, Zinc oxide, diaper rash cream) at least once daily  -DO NOT use any instrument (including fingers) in the anus first and then in the vagina. This will cause a bacterial infection of the vagina.     Treatment:  -BEST TREATMENT IS PREVENTION  -ointments are generally more potent than creams  -use just a thin layer  -during treatment (usually at least 2 wk) it is best to avoid intercourse to avoid trauma to the skin  -if diagnosed with chronic inflammatory skin condition (e.g. lichen sclerosis)         Most important is avoiding scratching & irritants         Work with gynecologist to form a steroid regimen for long term use         Often will need daily strong topical steroid (prescription) for at least 12 weeks.           Best to try to taper down to 2-3 times per week or weekly if able & can increase use in a flare  -if sensitive/dry skin         Add back some oils &/or moisture on a regular basis         Coconut oil is pure (no irritants) & contains ceramides (fatty acids) that are             important for maintaining skin barrier         Hyaluronic acid (there are suppositories for intravaginal use e.g Revaree)         Vaginal moisturizer products can be used topically as well (e.g. Replens, Luvena)         Still recommend a barrier product in addition (on top of the above)     If you are tempted to scratch:  -place ice pack on area for 15-20 minutes & distract yourself.  -if needed can (sparingly) place a thin layer of lidocaine ointment or cream        (e.g. Bikini-zone, etc over the counter)  -can take an oral anti-histamine (e.g. Benadryl, Claritin, Zyrtec, etc)  -can also use a topical steroid (hydrocortisone ointment over the counter) for a few days        Take care - chronic steroid use can cause skin thinning & can worsen your problem.

## 2025-04-05 ENCOUNTER — TELEPHONE (OUTPATIENT)
Dept: FAMILY MEDICINE CLINIC | Facility: CLINIC | Age: 45
End: 2025-04-05

## 2025-04-06 NOTE — TELEPHONE ENCOUNTER
Patient filled 5mg in December.     Prior auth completed await outcome     New criteria, patient must meet criteria of BMI over 40 OR 35 with 2 co morbidities

## 2025-04-06 NOTE — TELEPHONE ENCOUNTER
Libby Zuñiga Rn Triage (supporting Keily Dias, IVIS)Yesterday (3:46 PM)     AC  Hello, I called the pharmacy and they said my zepbound  needs a pre authorization thanks .

## 2025-04-06 NOTE — TELEPHONE ENCOUNTER
Denied    Note from payer: CaseId:[id number];Status:Denied;Review Type:Prior Auth;Appeal Information: Attention:ATTN:  CLINICAL APPEALS DEPARTMENT [employer] [address] [address],[place], [place],[address] Phone:[phone] Fax:[phone]; Important - Please read the below note on eAppeals: Please reference the denial letter for information on the rights for an appeal, rationale for the denial, and how to submit an appeal including if any information is needed to support the appeal. Note about urgent situations - Generally, an urgent situation is one which, in the opinion of the provider, the health of the patient may be in serious jeopardy or may experience pain that cannot be adequately controlled while waiting for a decision on the appeal.;  Payer: [employer] Case ID: [id number]    [phone]  Electronic appeal: Supported

## 2025-04-07 NOTE — TELEPHONE ENCOUNTER
Please let pt know. criteria changed for coverage so not longer covered.  Recommend joining jump start your health through the health system. Please give phone number

## 2025-05-30 DIAGNOSIS — N93.9 ABNORMAL UTERINE BLEEDING (AUB): Primary | ICD-10-CM

## 2025-05-30 RX ORDER — TRANEXAMIC ACID 650 MG/1
1300 TABLET ORAL 3 TIMES DAILY
Qty: 30 TABLET | Refills: 0 | Status: SHIPPED | OUTPATIENT
Start: 2025-05-30 | End: 2025-06-04

## 2025-06-02 ENCOUNTER — LAB ENCOUNTER (OUTPATIENT)
Dept: LAB | Age: 45
End: 2025-06-02
Attending: STUDENT IN AN ORGANIZED HEALTH CARE EDUCATION/TRAINING PROGRAM
Payer: COMMERCIAL

## 2025-06-02 DIAGNOSIS — N93.9 ABNORMAL UTERINE BLEEDING (AUB): ICD-10-CM

## 2025-06-02 LAB
BASOPHILS # BLD AUTO: 0.04 X10(3) UL (ref 0–0.2)
BASOPHILS NFR BLD AUTO: 0.5 %
DEPRECATED HBV CORE AB SER IA-ACNC: 34 NG/ML (ref 50–306)
DEPRECATED RDW RBC AUTO: 44.4 FL (ref 35.1–46.3)
EOSINOPHIL # BLD AUTO: 0.45 X10(3) UL (ref 0–0.7)
EOSINOPHIL NFR BLD AUTO: 5.2 %
ERYTHROCYTE [DISTWIDTH] IN BLOOD BY AUTOMATED COUNT: 13.2 % (ref 11–15)
HCT VFR BLD AUTO: 38.1 % (ref 35–48)
HGB BLD-MCNC: 12.4 G/DL (ref 12–16)
IMM GRANULOCYTES # BLD AUTO: 0.02 X10(3) UL (ref 0–1)
IMM GRANULOCYTES NFR BLD: 0.2 %
LYMPHOCYTES # BLD AUTO: 2.92 X10(3) UL (ref 1–4)
LYMPHOCYTES NFR BLD AUTO: 33.4 %
MCH RBC QN AUTO: 30 PG (ref 26–34)
MCHC RBC AUTO-ENTMCNC: 32.5 G/DL (ref 31–37)
MCV RBC AUTO: 92 FL (ref 80–100)
MONOCYTES # BLD AUTO: 0.58 X10(3) UL (ref 0.1–1)
MONOCYTES NFR BLD AUTO: 6.6 %
NEUTROPHILS # BLD AUTO: 4.72 X10 (3) UL (ref 1.5–7.7)
NEUTROPHILS # BLD AUTO: 4.72 X10(3) UL (ref 1.5–7.7)
NEUTROPHILS NFR BLD AUTO: 54.1 %
PLATELET # BLD AUTO: 216 10(3)UL (ref 150–450)
RBC # BLD AUTO: 4.14 X10(6)UL (ref 3.8–5.3)
WBC # BLD AUTO: 8.7 X10(3) UL (ref 4–11)

## 2025-06-02 PROCEDURE — 85025 COMPLETE CBC W/AUTO DIFF WBC: CPT

## 2025-06-02 PROCEDURE — 82728 ASSAY OF FERRITIN: CPT

## 2025-06-02 PROCEDURE — 36415 COLL VENOUS BLD VENIPUNCTURE: CPT

## 2025-07-18 ENCOUNTER — OFFICE VISIT (OUTPATIENT)
Dept: FAMILY MEDICINE CLINIC | Facility: CLINIC | Age: 45
End: 2025-07-18
Payer: COMMERCIAL

## 2025-07-18 VITALS
HEART RATE: 67 BPM | SYSTOLIC BLOOD PRESSURE: 112 MMHG | DIASTOLIC BLOOD PRESSURE: 77 MMHG | WEIGHT: 164 LBS | BODY MASS INDEX: 33 KG/M2

## 2025-07-18 DIAGNOSIS — M72.2 PLANTAR FASCIITIS: Primary | ICD-10-CM

## 2025-07-18 DIAGNOSIS — E06.3 HYPOTHYROIDISM DUE TO HASHIMOTO'S THYROIDITIS: ICD-10-CM

## 2025-07-18 DIAGNOSIS — E55.9 VITAMIN D DEFICIENCY: ICD-10-CM

## 2025-07-18 PROCEDURE — 99214 OFFICE O/P EST MOD 30 MIN: CPT | Performed by: FAMILY MEDICINE

## 2025-07-18 RX ORDER — ERGOCALCIFEROL 1.25 MG/1
50000 CAPSULE, LIQUID FILLED ORAL WEEKLY
Qty: 12 CAPSULE | Refills: 0 | Status: SHIPPED | OUTPATIENT
Start: 2025-07-18

## 2025-07-18 NOTE — PROGRESS NOTES
Chief Complaint   Patient presents with    Foot Pain     C/o right heel pain x 6 months     HPI:   Libby Fonseca is a 45 year old female who presents to clinic with complaints of R heel pain for months.   Pain is worse with prolonged walking or standing after being seated.   When she touches her R heel she feels a bump.   She has been doing exercises which help in the moment but have not been helping long-term.  Would like to see podiatry for further evaluation.    Patient would like to check her thyroid, has been consistent with levothyroxine use.  Her vitamin D was low at last visit and has not been taking supplementation, would like me to refill weekly vitamin D.    REVIEW OF SYSTEMS:   Negative, except per HPI.     EXAM:   /77 (BP Location: Right arm, Patient Position: Sitting, Cuff Size: adult)   Pulse 67   Wt 164 lb (74.4 kg)   LMP 06/20/2025 (Approximate)   BMI 33.12 kg/m²   Body mass index is 33.12 kg/m².  GENERAL: well developed, well nourished, in no apparent distress  SKIN: no rashes, no suspicious lesions  HEENT: atraumatic, normocephalic  EYES: PERRLA, EOMI,conjunctiva are clear  NECK: supple, no adenopathy    ASSESSMENT AND PLAN:     1. Plantar fasciitis  - HEP advised, can see podiatry given that her symptoms have not improved.  - Podiatry Referral - [facility]    2. Vitamin D deficiency  - ergocalciferol 1.25 MG (58345 UT) Oral Cap; Take 1 capsule (50,000 Units total) by mouth once a week.  Dispense: 12 capsule; Refill: 0    3. Hypothyroidism due to Hashimoto's thyroiditis  -Continue current levothyroxine dosing.  Checking labs  - TSH W Reflex To Free T4 [E]; Future     RTC if no improvement in symptoms. Red flags discussed to go to ER.     Love Garcia MD  7/18/2025  10:36 AM

## 2025-08-13 ENCOUNTER — OFFICE VISIT (OUTPATIENT)
Dept: PODIATRY CLINIC | Facility: CLINIC | Age: 45
End: 2025-08-13

## 2025-08-13 DIAGNOSIS — M72.2 PLANTAR FASCIITIS, RIGHT: Primary | ICD-10-CM

## 2025-08-13 PROCEDURE — 99204 OFFICE O/P NEW MOD 45 MIN: CPT | Performed by: PODIATRIST

## 2025-08-13 RX ORDER — METHYLPREDNISOLONE 4 MG/1
TABLET ORAL
Qty: 21 TABLET | Refills: 0 | Status: SHIPPED | OUTPATIENT
Start: 2025-08-13

## 2025-08-14 ENCOUNTER — PATIENT MESSAGE (OUTPATIENT)
Dept: PODIATRY CLINIC | Facility: CLINIC | Age: 45
End: 2025-08-14

## 2025-08-19 ENCOUNTER — OFFICE VISIT (OUTPATIENT)
Dept: ORTHOPEDICS CLINIC | Facility: CLINIC | Age: 45
End: 2025-08-19

## 2025-08-19 DIAGNOSIS — M72.2 PLANTAR FASCIITIS: Primary | ICD-10-CM

## 2025-08-19 DIAGNOSIS — M77.31 HEEL SPUR, RIGHT: ICD-10-CM

## 2025-08-19 PROCEDURE — 99204 OFFICE O/P NEW MOD 45 MIN: CPT | Performed by: STUDENT IN AN ORGANIZED HEALTH CARE EDUCATION/TRAINING PROGRAM

## (undated) NOTE — LETTER
3/21/2025    Libby Fonseca  [id number] [facility] [address]         Dear Libby,    This letter is to inform you that our office has made several attempts to reach you by phone without success.  We were attempting to contact you by phone regarding prescription request    Please contact our office at the number listed below as soon as you receive this letter to discuss this issue and to make the necessary changes in our system to your contact information.  Thank you for your cooperation.        Sincerely,    Manisha Pastor MD  [address] [place] [id number]  Ph: [phone]  Fax: [phone]         Document electronically generated by:  Keily ROBERSON RN